# Patient Record
Sex: FEMALE | Race: WHITE | NOT HISPANIC OR LATINO | Employment: FULL TIME | URBAN - METROPOLITAN AREA
[De-identification: names, ages, dates, MRNs, and addresses within clinical notes are randomized per-mention and may not be internally consistent; named-entity substitution may affect disease eponyms.]

---

## 2022-07-15 ENCOUNTER — LAB VISIT (OUTPATIENT)
Dept: LAB | Facility: HOSPITAL | Age: 32
End: 2022-07-15
Attending: FAMILY MEDICINE
Payer: MEDICAID

## 2022-07-15 ENCOUNTER — OFFICE VISIT (OUTPATIENT)
Dept: PRIMARY CARE CLINIC | Facility: CLINIC | Age: 32
End: 2022-07-15
Payer: MEDICAID

## 2022-07-15 VITALS
WEIGHT: 190.69 LBS | DIASTOLIC BLOOD PRESSURE: 80 MMHG | HEIGHT: 66 IN | SYSTOLIC BLOOD PRESSURE: 100 MMHG | HEART RATE: 85 BPM | BODY MASS INDEX: 30.65 KG/M2

## 2022-07-15 DIAGNOSIS — R63.5 ABNORMAL WEIGHT GAIN: ICD-10-CM

## 2022-07-15 DIAGNOSIS — Z13.220 SCREENING, LIPID: ICD-10-CM

## 2022-07-15 DIAGNOSIS — R06.83 SNORING: ICD-10-CM

## 2022-07-15 DIAGNOSIS — R53.83 FATIGUE, UNSPECIFIED TYPE: Primary | ICD-10-CM

## 2022-07-15 DIAGNOSIS — F41.9 ANXIETY: ICD-10-CM

## 2022-07-15 DIAGNOSIS — R53.83 FATIGUE, UNSPECIFIED TYPE: ICD-10-CM

## 2022-07-15 LAB
ALBUMIN SERPL BCP-MCNC: 4 G/DL (ref 3.5–5.2)
ALP SERPL-CCNC: 60 U/L (ref 55–135)
ALT SERPL W/O P-5'-P-CCNC: 14 U/L (ref 10–44)
ANION GAP SERPL CALC-SCNC: 9 MMOL/L (ref 8–16)
AST SERPL-CCNC: 14 U/L (ref 10–40)
BILIRUB SERPL-MCNC: 0.6 MG/DL (ref 0.1–1)
BUN SERPL-MCNC: 9 MG/DL (ref 6–20)
CALCIUM SERPL-MCNC: 9.2 MG/DL (ref 8.7–10.5)
CHLORIDE SERPL-SCNC: 105 MMOL/L (ref 95–110)
CHOLEST SERPL-MCNC: 158 MG/DL (ref 120–199)
CHOLEST/HDLC SERPL: 3.9 {RATIO} (ref 2–5)
CO2 SERPL-SCNC: 26 MMOL/L (ref 23–29)
CREAT SERPL-MCNC: 0.7 MG/DL (ref 0.5–1.4)
EST. GFR  (AFRICAN AMERICAN): >60 ML/MIN/1.73 M^2
EST. GFR  (NON AFRICAN AMERICAN): >60 ML/MIN/1.73 M^2
ESTIMATED AVG GLUCOSE: 100 MG/DL (ref 68–131)
GLUCOSE SERPL-MCNC: 84 MG/DL (ref 70–110)
HBA1C MFR BLD: 5.1 % (ref 4–5.6)
HDLC SERPL-MCNC: 41 MG/DL (ref 40–75)
HDLC SERPL: 25.9 % (ref 20–50)
INSULIN COLLECTION INTERVAL: NORMAL
INSULIN SERPL-ACNC: 12.8 UU/ML
LDLC SERPL CALC-MCNC: 104.8 MG/DL (ref 63–159)
NONHDLC SERPL-MCNC: 117 MG/DL
POTASSIUM SERPL-SCNC: 4.3 MMOL/L (ref 3.5–5.1)
PROT SERPL-MCNC: 7.2 G/DL (ref 6–8.4)
SODIUM SERPL-SCNC: 140 MMOL/L (ref 136–145)
TRIGL SERPL-MCNC: 61 MG/DL (ref 30–150)

## 2022-07-15 PROCEDURE — 80061 LIPID PANEL: CPT | Performed by: FAMILY MEDICINE

## 2022-07-15 PROCEDURE — 83036 HEMOGLOBIN GLYCOSYLATED A1C: CPT | Performed by: FAMILY MEDICINE

## 2022-07-15 PROCEDURE — 3074F PR MOST RECENT SYSTOLIC BLOOD PRESSURE < 130 MM HG: ICD-10-PCS | Mod: CPTII,,, | Performed by: FAMILY MEDICINE

## 2022-07-15 PROCEDURE — 80053 COMPREHEN METABOLIC PANEL: CPT | Performed by: FAMILY MEDICINE

## 2022-07-15 PROCEDURE — 99999 PR PBB SHADOW E&M-EST. PATIENT-LVL III: CPT | Mod: PBBFAC,,, | Performed by: FAMILY MEDICINE

## 2022-07-15 PROCEDURE — 1160F PR REVIEW ALL MEDS BY PRESCRIBER/CLIN PHARMACIST DOCUMENTED: ICD-10-PCS | Mod: CPTII,,, | Performed by: FAMILY MEDICINE

## 2022-07-15 PROCEDURE — 3079F PR MOST RECENT DIASTOLIC BLOOD PRESSURE 80-89 MM HG: ICD-10-PCS | Mod: CPTII,,, | Performed by: FAMILY MEDICINE

## 2022-07-15 PROCEDURE — 1159F PR MEDICATION LIST DOCUMENTED IN MEDICAL RECORD: ICD-10-PCS | Mod: CPTII,,, | Performed by: FAMILY MEDICINE

## 2022-07-15 PROCEDURE — 3008F PR BODY MASS INDEX (BMI) DOCUMENTED: ICD-10-PCS | Mod: CPTII,,, | Performed by: FAMILY MEDICINE

## 2022-07-15 PROCEDURE — 83525 ASSAY OF INSULIN: CPT | Performed by: FAMILY MEDICINE

## 2022-07-15 PROCEDURE — 3079F DIAST BP 80-89 MM HG: CPT | Mod: CPTII,,, | Performed by: FAMILY MEDICINE

## 2022-07-15 PROCEDURE — 99999 PR PBB SHADOW E&M-EST. PATIENT-LVL III: ICD-10-PCS | Mod: PBBFAC,,, | Performed by: FAMILY MEDICINE

## 2022-07-15 PROCEDURE — 99204 PR OFFICE/OUTPT VISIT, NEW, LEVL IV, 45-59 MIN: ICD-10-PCS | Mod: S$PBB,,, | Performed by: FAMILY MEDICINE

## 2022-07-15 PROCEDURE — 1159F MED LIST DOCD IN RCRD: CPT | Mod: CPTII,,, | Performed by: FAMILY MEDICINE

## 2022-07-15 PROCEDURE — 1160F RVW MEDS BY RX/DR IN RCRD: CPT | Mod: CPTII,,, | Performed by: FAMILY MEDICINE

## 2022-07-15 PROCEDURE — 99213 OFFICE O/P EST LOW 20 MIN: CPT | Mod: PBBFAC | Performed by: FAMILY MEDICINE

## 2022-07-15 PROCEDURE — 3074F SYST BP LT 130 MM HG: CPT | Mod: CPTII,,, | Performed by: FAMILY MEDICINE

## 2022-07-15 PROCEDURE — 99204 OFFICE O/P NEW MOD 45 MIN: CPT | Mod: S$PBB,,, | Performed by: FAMILY MEDICINE

## 2022-07-15 PROCEDURE — 3008F BODY MASS INDEX DOCD: CPT | Mod: CPTII,,, | Performed by: FAMILY MEDICINE

## 2022-07-15 RX ORDER — METFORMIN HYDROCHLORIDE 500 MG/1
500 TABLET, EXTENDED RELEASE ORAL 2 TIMES DAILY WITH MEALS
Qty: 60 TABLET | Refills: 2 | Status: SHIPPED | OUTPATIENT
Start: 2022-07-15

## 2022-07-15 NOTE — PROGRESS NOTES
Subjective:       Patient ID: Lily Linda is a 32 y.o. female.  Pmhx, fam hx, soc hx, surg hx, allergies, med list reviewed    Chief Complaint: No chief complaint on file.  Referring MD: Myke  PCP: none (pt lives in [place]--given some resources)  BMI noted  Diet: variable  Exercise/Activity: not active; used to run  Sleep: fair; she does snore; pt agreeable to consider sleep study  Stressors: none new  Works at [employer]  Anxiety/Depression Screen/PHQ-2: neg; 0    Pt here from Dr Stringer for lifestyle changes. Has been gaining weight and significant fatigue. She would like to be 145-150.  Was eating healthy a few years ago; got put on phentermine. Tolerated but some elevated heart rate.       Some anxiety/depression. No mh crisis. No recent worsening.     Pt had some labs for spotting: tsh was within normal limits. Dr Stringer mentioned doing IR testing.   .No fam hx of DM II. No hx of gestational DM II.     Diet has improved since started working.   Buys tea by the gallon--has not tried unsweet. Drinks sometimes citrus. NO coffee.     Pt usually eats a rice cake with peanut butter. Likes this. Drinking water.     For lunch brings [employer] and [employer] lunches. Uses white bread. Does not like wheat bread. Likes butter bread.   Dinner: sometimes puts stuff in air fryer.   Likes rice. Has not tried brown rice. Does not like cauli rice.   Lives salad and cucumbers. Does not like hummus. Likes yogurt.   Does not snack.   Likes fruit-pineapple and cantaloupe.     HPI  Review of Systems   Constitutional: Negative for activity change, appetite change, fatigue and fever.   HENT: Negative for mouth dryness and goiter.    Eyes: Negative for visual disturbance.   Respiratory: Negative for apnea, cough, chest tightness and shortness of breath.    Cardiovascular: Negative for chest pain, palpitations and leg swelling.   Gastrointestinal: Negative for abdominal pain, constipation and diarrhea.   Endocrine: Negative  for cold intolerance, heat intolerance, polydipsia, polyphagia and polyuria.   Genitourinary: Negative for frequency and menstrual problem.   Musculoskeletal: Negative for arthralgias and myalgias.   Integumentary:  Negative for color change and rash.   Psychiatric/Behavioral: Negative for sleep disturbance. The patient is not nervous/anxious.          Objective:      Physical Exam  Vitals and nursing note reviewed.   Constitutional:       General: She is not in acute distress.  HENT:      Head: Normocephalic and atraumatic.      Mouth/Throat:      Pharynx: Oropharynx is clear.   Eyes:      General: No scleral icterus.     Pupils: Pupils are equal, round, and reactive to light.   Neck:      Comments: No TM  Cardiovascular:      Rate and Rhythm: Normal rate and regular rhythm.      Pulses: Normal pulses.      Heart sounds: Normal heart sounds. No murmur heard.    No friction rub. No gallop.   Pulmonary:      Effort: Pulmonary effort is normal. No respiratory distress.      Breath sounds: Normal breath sounds. No wheezing, rhonchi or rales.   Abdominal:      General: Bowel sounds are normal. There is no distension.      Palpations: Abdomen is soft.      Tenderness: There is no abdominal tenderness.   Musculoskeletal:         General: No swelling.      Cervical back: Normal range of motion and neck supple. No tenderness.   Lymphadenopathy:      Cervical: No cervical adenopathy.   Skin:     General: Skin is warm.      Capillary Refill: Capillary refill takes less than 2 seconds.      Findings: No erythema or rash.   Neurological:      Mental Status: She is alert and oriented to person, place, and time.   Psychiatric:         Mood and Affect: Mood normal.         Behavior: Behavior normal.         Assessment:       Problem List Items Addressed This Visit    None     Visit Diagnoses     Abnormal weight gain                ICD-10-CM ICD-9-CM   1. Fatigue, unspecified type  R53.83 780.79   2. Abnormal weight gain  R63.5  783.1   3. Anxiety  F41.9 300.00   4. Body mass index (BMI) 30.0-30.9, adult  Z68.30 V85.30   5. Screening, lipid  Z13.220 V77.91   6. Snoring  R06.83 786.09     Plan:       Fatigue, unspecified type  Comments:  check labs, est PCP (pt given numbers); referral sleep eval  Orders:  -     Hemoglobin A1C; Future; Expected date: 07/15/2022  -     Insulin, Random; Future; Expected date: 07/15/2022  -     Comprehensive Metabolic Panel; Future; Expected date: 07/15/2022  -     metFORMIN (GLUCOPHAGE-XR) 500 MG ER 24hr tablet; Take 1 tablet (500 mg total) by mouth 2 (two) times daily with meals.  Dispense: 60 tablet; Refill: 2  -     Ambulatory referral/consult to Pulmonology; Future; Expected date: 07/22/2022    Abnormal weight gain  Comments:  Pt to call and schedule gtt/insulin test with Dr Stringer's office  rx changed to metformin xr (holding until test) d/w pt SE's; start daily not bid  Orders:  -     Ambulatory referral/consult to Family Practice  -     Hemoglobin A1C; Future; Expected date: 07/15/2022  -     Insulin, Random; Future; Expected date: 07/15/2022  -     Lipid Panel; Future; Expected date: 07/15/2022  -     Comprehensive Metabolic Panel; Future; Expected date: 07/15/2022  -     metFORMIN (GLUCOPHAGE-XR) 500 MG ER 24hr tablet; Take 1 tablet (500 mg total) by mouth 2 (two) times daily with meals.  Dispense: 60 tablet; Refill: 2    Anxiety  Comments:  chronic/intermittent/stable  Orders:  -     Comprehensive Metabolic Panel; Future; Expected date: 07/15/2022    Body mass index (BMI) 30.0-30.9, adult  -     Hemoglobin A1C; Future; Expected date: 07/15/2022  -     Insulin, Random; Future; Expected date: 07/15/2022  -     Lipid Panel; Future; Expected date: 07/15/2022  -     Ambulatory referral/consult to Pulmonology; Future; Expected date: 07/22/2022    Screening, lipid  -     Lipid Panel; Future; Expected date: 07/15/2022    Snoring  -     Ambulatory referral/consult to Pulmonology; Future; Expected date:  07/22/2022       Check labs today  Food log  [facility] eat fit log  Labs today  No fam hx of men 2, pancreatitis; would be willing to try glp-1 if can get approved; d/w her moa, potential side effects    45 min spent with patient; will have close fu/ 2-3 weeks

## 2022-07-18 ENCOUNTER — PATIENT MESSAGE (OUTPATIENT)
Dept: PRIMARY CARE CLINIC | Facility: CLINIC | Age: 32
End: 2022-07-18
Payer: MEDICAID

## 2022-07-19 ENCOUNTER — PATIENT MESSAGE (OUTPATIENT)
Dept: PRIMARY CARE CLINIC | Facility: CLINIC | Age: 32
End: 2022-07-19
Payer: MEDICAID

## 2022-07-19 ENCOUNTER — DOCUMENTATION ONLY (OUTPATIENT)
Dept: PRIMARY CARE CLINIC | Facility: CLINIC | Age: 32
End: 2022-07-19
Payer: MEDICAID

## 2022-07-19 ENCOUNTER — PATIENT MESSAGE (OUTPATIENT)
Dept: PULMONOLOGY | Facility: CLINIC | Age: 32
End: 2022-07-19
Payer: MEDICAID

## 2022-08-03 ENCOUNTER — TELEPHONE (OUTPATIENT)
Dept: GASTROENTEROLOGY | Facility: CLINIC | Age: 32
End: 2022-08-03
Payer: MEDICAID

## 2022-08-03 NOTE — TELEPHONE ENCOUNTER
Returned call to pt who requested a later time on the day of her appt on 8/9/22. Pt was informed that the schedule was fully booked and was offered a different day. Pt stated she will keep the appointment and try to make it on time.

## 2022-08-03 NOTE — TELEPHONE ENCOUNTER
----- Message from Andi Linares sent at 8/3/2022  4:43 PM CDT -----  Contact: [phone]  Type:  Sooner Apoointment Request    Caller is requesting a sooner appointment.  Caller declined first available appointment listed below.  Caller will not accept being placed on the waitlist and is requesting a message be sent to doctor.  Name of Caller:PATSY BRIONES   When is the first available appointment?  Symptoms:appt  Would the patient rather a call back or a response via [facility]? Call back   Best Call Back Number:[phone]  Additional Information: sooner appointment request due to childcare obligations      Thanks  Nathan

## 2022-08-09 ENCOUNTER — OFFICE VISIT (OUTPATIENT)
Dept: GASTROENTEROLOGY | Facility: CLINIC | Age: 32
End: 2022-08-09
Payer: MEDICAID

## 2022-08-09 ENCOUNTER — OFFICE VISIT (OUTPATIENT)
Dept: PRIMARY CARE CLINIC | Facility: CLINIC | Age: 32
End: 2022-08-09
Payer: MEDICAID

## 2022-08-09 ENCOUNTER — HOSPITAL ENCOUNTER (OUTPATIENT)
Dept: RADIOLOGY | Facility: HOSPITAL | Age: 32
Discharge: HOME | End: 2022-08-09
Attending: PHYSICIAN ASSISTANT
Payer: MEDICAID

## 2022-08-09 VITALS
OXYGEN SATURATION: 99 % | HEART RATE: 60 BPM | HEIGHT: 66 IN | WEIGHT: 188.5 LBS | SYSTOLIC BLOOD PRESSURE: 120 MMHG | BODY MASS INDEX: 30.29 KG/M2 | DIASTOLIC BLOOD PRESSURE: 84 MMHG

## 2022-08-09 DIAGNOSIS — R63.5 ABNORMAL WEIGHT GAIN: ICD-10-CM

## 2022-08-09 DIAGNOSIS — R19.7 DIARRHEA, UNSPECIFIED TYPE: ICD-10-CM

## 2022-08-09 DIAGNOSIS — K59.00 CONSTIPATION, UNSPECIFIED CONSTIPATION TYPE: ICD-10-CM

## 2022-08-09 DIAGNOSIS — R14.0 BLOATING: Primary | ICD-10-CM

## 2022-08-09 DIAGNOSIS — R53.83 FATIGUE, UNSPECIFIED TYPE: Primary | ICD-10-CM

## 2022-08-09 DIAGNOSIS — R06.83 SNORING: ICD-10-CM

## 2022-08-09 DIAGNOSIS — R63.5 WEIGHT GAIN: ICD-10-CM

## 2022-08-09 DIAGNOSIS — E66.9 OBESITY (BMI 30.0-34.9): ICD-10-CM

## 2022-08-09 DIAGNOSIS — R11.0 NAUSEA: ICD-10-CM

## 2022-08-09 DIAGNOSIS — K21.9 GASTROESOPHAGEAL REFLUX DISEASE, UNSPECIFIED WHETHER ESOPHAGITIS PRESENT: ICD-10-CM

## 2022-08-09 DIAGNOSIS — R14.0 BLOATING: ICD-10-CM

## 2022-08-09 PROCEDURE — 99203 PR OFFICE/OUTPT VISIT, NEW, LEVL III, 30-44 MIN: ICD-10-PCS | Mod: S$PBB,,, | Performed by: PHYSICIAN ASSISTANT

## 2022-08-09 PROCEDURE — 3074F PR MOST RECENT SYSTOLIC BLOOD PRESSURE < 130 MM HG: ICD-10-PCS | Mod: CPTII,,, | Performed by: PHYSICIAN ASSISTANT

## 2022-08-09 PROCEDURE — 74019 RADEX ABDOMEN 2 VIEWS: CPT | Mod: TC

## 2022-08-09 PROCEDURE — 1160F RVW MEDS BY RX/DR IN RCRD: CPT | Mod: CPTII,,, | Performed by: FAMILY MEDICINE

## 2022-08-09 PROCEDURE — 99212 OFFICE O/P EST SF 10 MIN: CPT | Mod: PBBFAC,27 | Performed by: FAMILY MEDICINE

## 2022-08-09 PROCEDURE — 99214 OFFICE O/P EST MOD 30 MIN: CPT | Mod: PBBFAC | Performed by: PHYSICIAN ASSISTANT

## 2022-08-09 PROCEDURE — 1159F PR MEDICATION LIST DOCUMENTED IN MEDICAL RECORD: ICD-10-PCS | Mod: CPTII,,, | Performed by: PHYSICIAN ASSISTANT

## 2022-08-09 PROCEDURE — 3074F SYST BP LT 130 MM HG: CPT | Mod: CPTII,,, | Performed by: PHYSICIAN ASSISTANT

## 2022-08-09 PROCEDURE — 3079F PR MOST RECENT DIASTOLIC BLOOD PRESSURE 80-89 MM HG: ICD-10-PCS | Mod: CPTII,,, | Performed by: PHYSICIAN ASSISTANT

## 2022-08-09 PROCEDURE — 1159F MED LIST DOCD IN RCRD: CPT | Mod: CPTII,,, | Performed by: FAMILY MEDICINE

## 2022-08-09 PROCEDURE — 74019 XR ABDOMEN FLAT AND ERECT: ICD-10-PCS | Mod: 26,,, | Performed by: RADIOLOGY

## 2022-08-09 PROCEDURE — 3044F PR MOST RECENT HEMOGLOBIN A1C LEVEL <7.0%: ICD-10-PCS | Mod: CPTII,,, | Performed by: FAMILY MEDICINE

## 2022-08-09 PROCEDURE — 3079F DIAST BP 80-89 MM HG: CPT | Mod: CPTII,,, | Performed by: PHYSICIAN ASSISTANT

## 2022-08-09 PROCEDURE — 1159F MED LIST DOCD IN RCRD: CPT | Mod: CPTII,,, | Performed by: PHYSICIAN ASSISTANT

## 2022-08-09 PROCEDURE — 1159F PR MEDICATION LIST DOCUMENTED IN MEDICAL RECORD: ICD-10-PCS | Mod: CPTII,,, | Performed by: FAMILY MEDICINE

## 2022-08-09 PROCEDURE — 99999 PR PBB SHADOW E&M-EST. PATIENT-LVL II: ICD-10-PCS | Mod: PBBFAC,,, | Performed by: FAMILY MEDICINE

## 2022-08-09 PROCEDURE — 3008F BODY MASS INDEX DOCD: CPT | Mod: CPTII,,, | Performed by: PHYSICIAN ASSISTANT

## 2022-08-09 PROCEDURE — 99999 PR PBB SHADOW E&M-EST. PATIENT-LVL IV: ICD-10-PCS | Mod: PBBFAC,,, | Performed by: PHYSICIAN ASSISTANT

## 2022-08-09 PROCEDURE — 3044F HG A1C LEVEL LT 7.0%: CPT | Mod: CPTII,,, | Performed by: FAMILY MEDICINE

## 2022-08-09 PROCEDURE — 99214 PR OFFICE/OUTPT VISIT, EST, LEVL IV, 30-39 MIN: ICD-10-PCS | Mod: S$PBB,,, | Performed by: FAMILY MEDICINE

## 2022-08-09 PROCEDURE — 3044F PR MOST RECENT HEMOGLOBIN A1C LEVEL <7.0%: ICD-10-PCS | Mod: CPTII,,, | Performed by: PHYSICIAN ASSISTANT

## 2022-08-09 PROCEDURE — 99214 OFFICE O/P EST MOD 30 MIN: CPT | Mod: S$PBB,,, | Performed by: FAMILY MEDICINE

## 2022-08-09 PROCEDURE — 3044F HG A1C LEVEL LT 7.0%: CPT | Mod: CPTII,,, | Performed by: PHYSICIAN ASSISTANT

## 2022-08-09 PROCEDURE — 99203 OFFICE O/P NEW LOW 30 MIN: CPT | Mod: S$PBB,,, | Performed by: PHYSICIAN ASSISTANT

## 2022-08-09 PROCEDURE — 3008F PR BODY MASS INDEX (BMI) DOCUMENTED: ICD-10-PCS | Mod: CPTII,,, | Performed by: PHYSICIAN ASSISTANT

## 2022-08-09 PROCEDURE — 1160F PR REVIEW ALL MEDS BY PRESCRIBER/CLIN PHARMACIST DOCUMENTED: ICD-10-PCS | Mod: CPTII,,, | Performed by: FAMILY MEDICINE

## 2022-08-09 PROCEDURE — 74019 RADEX ABDOMEN 2 VIEWS: CPT | Mod: 26,,, | Performed by: RADIOLOGY

## 2022-08-09 PROCEDURE — 99999 PR PBB SHADOW E&M-EST. PATIENT-LVL II: CPT | Mod: PBBFAC,,, | Performed by: FAMILY MEDICINE

## 2022-08-09 PROCEDURE — 99999 PR PBB SHADOW E&M-EST. PATIENT-LVL IV: CPT | Mod: PBBFAC,,, | Performed by: PHYSICIAN ASSISTANT

## 2022-08-09 RX ORDER — PHENTERMINE HYDROCHLORIDE 37.5 MG/1
TABLET ORAL
Qty: 30 TABLET | Refills: 0 | Status: SHIPPED | OUTPATIENT
Start: 2022-08-09

## 2022-08-09 NOTE — PROGRESS NOTES
Clinic Consult:  [facility] Consultation Note    Reason for Consult:  The primary encounter diagnosis was Bloating. Diagnoses of Abnormal weight gain, Constipation, unspecified constipation type, Diarrhea, unspecified type, Nausea, and Gastroesophageal reflux disease, unspecified whether esophagitis present were also pertinent to this visit.    PCP: Primary Doctor Jesica   [address] / [place] [address]    CC: Abdominal Pain and Diarrhea      HPI:  This is a 32 y.o. female here for evaluation of the above. Began with severe fatigue, nausea, stomach problems. Alternating between diarrhea and constipation. Always had problems with stomach - throughout the day her stomach will hurt, with or without eating. For the past year she has had change in bowel habits. No blood in the stool. Having a BM twice daily but can be more. After a hard BM, it will follow with diarrhea and nausea. No vomiting. Always have indigestion and gas and bloating. Has been to the ED for this. Never had scopes. No GI family history. Just prescribed Metformin but has not taken yet.     Review of Systems   Constitutional: Positive for fatigue and unexpected weight change (gain). Negative for activity change, appetite change, chills, diaphoresis and fever.   HENT: Negative for trouble swallowing.    Respiratory: Negative for cough and shortness of breath.    Cardiovascular: Negative for chest pain.   Gastrointestinal: Positive for abdominal distention (bloating), abdominal pain, constipation, diarrhea and nausea. Negative for anal bleeding, blood in stool and vomiting.   Genitourinary: Negative for difficulty urinating, dysuria and hematuria.   Musculoskeletal: Negative for back pain.   Skin: Negative for color change and pallor.   Neurological: Negative for dizziness, weakness and light-headedness.   Psychiatric/Behavioral: Negative for dysphoric mood. The patient is not nervous/anxious.         Medical History:  "  Past Medical History:   Diagnosis Date    Abnormal glandular Papanicolaou smear of cervix 3/10/2014 10:04:41 AM    [facility] Historical - Gynecologic: Abnormal PAP Smear-lgsil    Unspecified chlamydial infection, in conditions classified elsewhere and of unspecified site 5/17/2016 4:57:49 PM    [facility] Historical - [facility]: Chlamydia-No Additional Notes       Surgical History:   No past surgical history on file.    Family History:    No family history on file.    Social History:   Social History     Socioeconomic History    Marital status: Single   Tobacco Use    Smoking status: Never Smoker    Smokeless tobacco: Never Used   Substance and Sexual Activity    Drug use: Never    Sexual activity: Yes     Partners: Male     Birth control/protection: Partner-Vasectomy       Allergies:   Review of patient's allergies indicates:  No Known Allergies    Home Medications:   Current Outpatient Medications on File Prior to Visit   Medication Sig Dispense Refill    metFORMIN (GLUCOPHAGE-XR) 500 MG ER 24hr tablet Take 1 tablet (500 mg total) by mouth 2 (two) times daily with meals. (Patient not taking: Reported on 8/9/2022) 60 tablet 2     No current facility-administered medications on file prior to visit.       Physical Exam:  /84   Pulse 60   Ht 5' 6" (1.676 m)   Wt 85.5 kg (188 lb 7.9 oz)   SpO2 99%   BMI 30.42 kg/m²   Body mass index is 30.42 kg/m².  Physical Exam  Constitutional:       General: She is not in acute distress.     Appearance: Normal appearance. She is not ill-appearing, toxic-appearing or diaphoretic.   HENT:      Head: Normocephalic and atraumatic.   Eyes:      General: No scleral icterus.     Extraocular Movements: Extraocular movements intact.   Cardiovascular:      Rate and Rhythm: Normal rate and regular rhythm.   Pulmonary:      Effort: Pulmonary effort is normal. No respiratory distress.      Breath sounds: Normal breath sounds.   Abdominal:      General: Bowel sounds are " normal. There is no distension.      Palpations: Abdomen is soft. There is no mass.      Tenderness: There is no abdominal tenderness. There is no guarding.   Musculoskeletal:         General: Normal range of motion.      Cervical back: Normal range of motion.   Skin:     General: Skin is warm and dry.      Coloration: Skin is not jaundiced or pale.   Neurological:      Mental Status: She is alert and oriented to person, place, and time.   Psychiatric:         Mood and Affect: Mood normal.         Behavior: Behavior normal.           Labs: Pertinent labs reviewed.  Endoscopy: none  CRC Screening: none  Anticoagulation: none    Assessment:  1. Bloating    2. Abnormal weight gain    3. Constipation, unspecified constipation type    4. Diarrhea, unspecified type    5. Nausea    6. Gastroesophageal reflux disease, unspecified whether esophagitis present         Recommendations:  -Suspect constipation  -Xray today for further evaluation. Can be contributing to discomfort, diarrhea, nausea, weight gain, gas and reflux  -Discussed starting Protonix. She does not like taking medications unless absolutely necessary. Will see what Xray results are. If constipated will treat and see if reflux improves. If not, will treat with PPI.  -All labs normal.  -GERD diet.    Bloating  -     X-Ray Abdomen Flat And Erect; Future; Expected date: 08/09/2022    Abnormal weight gain  -     Ambulatory referral/consult to Gastroenterology  -     X-Ray Abdomen Flat And Erect; Future; Expected date: 08/09/2022    Constipation, unspecified constipation type  -     X-Ray Abdomen Flat And Erect; Future; Expected date: 08/09/2022    Diarrhea, unspecified type  -     X-Ray Abdomen Flat And Erect; Future; Expected date: 08/09/2022    Nausea  -     X-Ray Abdomen Flat And Erect; Future; Expected date: 08/09/2022    Gastroesophageal reflux disease, unspecified whether esophagitis present        No follow-ups on file.    Thank you so much for allowing me  to participate in the care of Lily Austin PA-C

## 2022-08-09 NOTE — PROGRESS NOTES
Subjective:       Patient ID: Lily Linda is a 32 y.o. female.  Pmhx, fam hx, soc hx, surg hx, allergies, med list reviewed    Chief Complaint: recheck (Refer by physician for wellness and weight gain)    Wt Readings from Last 3 Encounters:   08/09/22 0846 85.5 kg (188 lb 7.9 oz)   07/15/22 0806 86.5 kg (190 lb 11.2 oz)   07/05/22 1450 87.1 kg (192 lb)       Pt has lost 4 lb since last month. Sometimes she gets so busy forgets to drink. At home is doing well. Sister on board. Carlitos does tempt at times with candy but she has tried other things.Tea  Is still an issue but has added water in. No new issues at work. Pt is trying to find balance in eating out vs bringing lunch to work. Has tried some almonds for snacks. Holding off on metformin.   Insurance not covering glp-1.    Had GI appt but still no contact with Pulmonary; was left vm and central scheduling but has not heard back. Pt needs number to call.     Energy has been modestly improved. On occasion feels tired. Son started back to school.     Pt feels like has done well overall with watching sugars. Is agreeable to send another food log.     Pt took phentermine in the past (on for 2 mos 2-3 years ago) and did not have any significant side effects. No hx of bipolar. No uncontrolled anxiety. No hallucinations.   NO hx of palpitations or ectopy.          HPI  Review of Systems   Constitutional: Negative for activity change, appetite change, fatigue and fever.   HENT: Negative for mouth dryness and goiter.    Eyes: Negative for visual disturbance.   Respiratory: Negative for apnea, cough, chest tightness and shortness of breath.    Cardiovascular: Negative for chest pain, palpitations and leg swelling.   Gastrointestinal: Negative for abdominal pain, constipation and diarrhea.   Endocrine: Negative for cold intolerance, heat intolerance, polydipsia, polyphagia and polyuria.   Genitourinary: Negative for frequency and menstrual problem.   Musculoskeletal:  Negative for arthralgias and myalgias.   Integumentary:  Negative for color change and rash.   Psychiatric/Behavioral: Negative for sleep disturbance. The patient is not nervous/anxious.          Objective:      Physical Exam  Vitals and nursing note reviewed.   Constitutional:       General: She is not in acute distress.  HENT:      Head: Normocephalic and atraumatic.      Mouth/Throat:      Pharynx: Oropharynx is clear.   Eyes:      General: No scleral icterus.     Pupils: Pupils are equal, round, and reactive to light.   Neck:      Comments: No TM  Cardiovascular:      Rate and Rhythm: Normal rate and regular rhythm.      Pulses: Normal pulses.      Heart sounds: Normal heart sounds. No murmur heard.    No friction rub. No gallop.   Pulmonary:      Effort: Pulmonary effort is normal. No respiratory distress.      Breath sounds: Normal breath sounds. No wheezing, rhonchi or rales.   Abdominal:      General: Bowel sounds are normal. There is no distension.      Palpations: Abdomen is soft.      Tenderness: There is no abdominal tenderness.   Musculoskeletal:         General: No swelling.      Cervical back: Normal range of motion and neck supple. No tenderness.   Lymphadenopathy:      Cervical: No cervical adenopathy.   Skin:     General: Skin is warm.      Findings: No erythema or rash.   Neurological:      Mental Status: She is alert and oriented to person, place, and time.   Psychiatric:         Mood and Affect: Mood normal.         Behavior: Behavior normal.         Assessment:       Problem List Items Addressed This Visit    None         1. Fatigue, unspecified type    2. Snoring    3. Weight gain    4. Obesity (BMI 30.0-34.9)    5.      Constipation    Plan:       Fatigue, unspecified type  Snoring  Pt given number again to call and schedule for sleep clinic eval    Weight gain  Obesity (BMI 30.0-34.9)  -     phentermine (ADIPEX-P) 37.5 mg tablet; 1/2-1 pill po daily  Dispense: 30 tablet; Refill: 0    Chronic  Constipation w/up underway: pt advised to keep GI appt; had xray today; looks okay overall; w/up per them  Did d/w pt ways to increase fiber in small increments (pt to try this week) from dietary sources as well as possible supplement (benefiber or similar)  Also, increase water consumption at work       DW pt risks/benefits/side effects at length including risks/benefit/se's elevated hr, elevated bp, dry mouth, irritability. Pt is simultaneously working on lifestyle changes.   Very low risk abuse potential; on no other stimulants; pt reminded med information sent via [employer] (on AVS)--she prefers to read digitally    reviewed          Will give pt number for pulmonary  Digestive probiotic   Ok for b complex supplement  Work on water at work    30 + min spent with pt; gi note/xray reviewed  Reviewed with pt risks/benefits/potential se's of therapy as well as printed coupon   F/u 4-6 weeks

## 2022-08-10 DIAGNOSIS — R11.0 NAUSEA: Primary | ICD-10-CM

## 2022-08-10 DIAGNOSIS — K21.9 GASTROESOPHAGEAL REFLUX DISEASE, UNSPECIFIED WHETHER ESOPHAGITIS PRESENT: ICD-10-CM

## 2022-08-10 RX ORDER — PANTOPRAZOLE SODIUM 20 MG/1
20 TABLET, DELAYED RELEASE ORAL DAILY
Qty: 30 TABLET | Refills: 11 | Status: SHIPPED | OUTPATIENT
Start: 2022-08-10 | End: 2023-08-10

## 2022-08-12 ENCOUNTER — PATIENT MESSAGE (OUTPATIENT)
Dept: GASTROENTEROLOGY | Facility: CLINIC | Age: 32
End: 2022-08-12
Payer: MEDICAID

## 2024-10-17 ENCOUNTER — PATIENT MESSAGE (OUTPATIENT)
Dept: ADMINISTRATIVE | Facility: OTHER | Age: 34
End: 2024-10-17
Payer: MEDICAID

## 2024-11-22 ENCOUNTER — HOSPITAL ENCOUNTER (OUTPATIENT)
Facility: HOSPITAL | Age: 34
LOS: 1 days | Discharge: HOME | End: 2024-11-25
Attending: EMERGENCY MEDICINE | Admitting: OBSTETRICS & GYNECOLOGY
Payer: MEDICAID

## 2024-11-22 DIAGNOSIS — K80.20 CHOLELITHIASIS AFFECTING PREGNANCY IN SECOND TRIMESTER, ANTEPARTUM: Primary | ICD-10-CM

## 2024-11-22 DIAGNOSIS — K80.10 CALCULUS OF GALLBLADDER WITH CHOLECYSTITIS WITHOUT BILIARY OBSTRUCTION, UNSPECIFIED CHOLECYSTITIS ACUITY: ICD-10-CM

## 2024-11-22 DIAGNOSIS — R00.0 TACHYCARDIA: ICD-10-CM

## 2024-11-22 DIAGNOSIS — K80.50 BILIARY COLIC: ICD-10-CM

## 2024-11-22 DIAGNOSIS — O26.612 CHOLELITHIASIS AFFECTING PREGNANCY IN SECOND TRIMESTER, ANTEPARTUM: Primary | ICD-10-CM

## 2024-11-22 LAB
ALBUMIN SERPL BCP-MCNC: 2.9 G/DL (ref 3.5–5.2)
ALP SERPL-CCNC: 75 U/L (ref 40–150)
ALT SERPL W/O P-5'-P-CCNC: 12 U/L (ref 10–44)
AMORPH CRY URNS QL MICRO: ABNORMAL
ANION GAP SERPL CALC-SCNC: 10 MMOL/L (ref 8–16)
AST SERPL-CCNC: 14 U/L (ref 10–40)
BACTERIA #/AREA URNS HPF: ABNORMAL /HPF
BASOPHILS # BLD AUTO: 0.02 K/UL (ref 0–0.2)
BASOPHILS NFR BLD: 0.2 % (ref 0–1.9)
BILIRUB SERPL-MCNC: 0.2 MG/DL (ref 0.1–1)
BILIRUB UR QL STRIP: NEGATIVE
BUN SERPL-MCNC: 4 MG/DL (ref 6–20)
CALCIUM SERPL-MCNC: 8.8 MG/DL (ref 8.7–10.5)
CHLORIDE SERPL-SCNC: 107 MMOL/L (ref 95–110)
CLARITY UR: ABNORMAL
CO2 SERPL-SCNC: 22 MMOL/L (ref 23–29)
COLOR UR: YELLOW
CREAT SERPL-MCNC: 0.7 MG/DL (ref 0.5–1.4)
DIFFERENTIAL METHOD BLD: ABNORMAL
EOSINOPHIL # BLD AUTO: 0.1 K/UL (ref 0–0.5)
EOSINOPHIL NFR BLD: 0.6 % (ref 0–8)
ERYTHROCYTE [DISTWIDTH] IN BLOOD BY AUTOMATED COUNT: 13.8 % (ref 11.5–14.5)
EST. GFR  (NO RACE VARIABLE): >60 ML/MIN/1.73 M^2
GLUCOSE SERPL-MCNC: 93 MG/DL (ref 70–110)
GLUCOSE UR QL STRIP: ABNORMAL
HCT VFR BLD AUTO: 35.2 % (ref 37–48.5)
HGB BLD-MCNC: 11.8 G/DL (ref 12–16)
HGB UR QL STRIP: NEGATIVE
HYALINE CASTS #/AREA URNS LPF: 5 /LPF
IMM GRANULOCYTES # BLD AUTO: 0.06 K/UL (ref 0–0.04)
IMM GRANULOCYTES NFR BLD AUTO: 0.5 % (ref 0–0.5)
INFLUENZA A, MOLECULAR: NEGATIVE
INFLUENZA B, MOLECULAR: NEGATIVE
KETONES UR QL STRIP: NEGATIVE
LEUKOCYTE ESTERASE UR QL STRIP: NEGATIVE
LIPASE SERPL-CCNC: 6 U/L (ref 4–60)
LYMPHOCYTES # BLD AUTO: 2 K/UL (ref 1–4.8)
LYMPHOCYTES NFR BLD: 15.7 % (ref 18–48)
MCH RBC QN AUTO: 28.9 PG (ref 27–31)
MCHC RBC AUTO-ENTMCNC: 33.5 G/DL (ref 32–36)
MCV RBC AUTO: 86 FL (ref 82–98)
MICROSCOPIC COMMENT: ABNORMAL
MONOCYTES # BLD AUTO: 0.9 K/UL (ref 0.3–1)
MONOCYTES NFR BLD: 7.5 % (ref 4–15)
NEUTROPHILS # BLD AUTO: 9.4 K/UL (ref 1.8–7.7)
NEUTROPHILS NFR BLD: 75.5 % (ref 38–73)
NITRITE UR QL STRIP: NEGATIVE
NRBC BLD-RTO: 0 /100 WBC
PH UR STRIP: 8 [PH] (ref 5–8)
PLATELET # BLD AUTO: 259 K/UL (ref 150–450)
PMV BLD AUTO: 11 FL (ref 9.2–12.9)
POTASSIUM SERPL-SCNC: 3.4 MMOL/L (ref 3.5–5.1)
PROT SERPL-MCNC: 6.5 G/DL (ref 6–8.4)
PROT UR QL STRIP: ABNORMAL
RBC # BLD AUTO: 4.08 M/UL (ref 4–5.4)
RBC #/AREA URNS HPF: 3 /HPF (ref 0–4)
SARS-COV-2 RDRP RESP QL NAA+PROBE: NEGATIVE
SODIUM SERPL-SCNC: 139 MMOL/L (ref 136–145)
SP GR UR STRIP: 1.02 (ref 1–1.03)
SPECIMEN SOURCE: NORMAL
SQUAMOUS #/AREA URNS HPF: 7 /HPF
TROPONIN I SERPL DL<=0.01 NG/ML-MCNC: <0.006 NG/ML (ref 0–0.03)
URN SPEC COLLECT METH UR: ABNORMAL
UROBILINOGEN UR STRIP-ACNC: NEGATIVE EU/DL
WBC # BLD AUTO: 12.43 K/UL (ref 3.9–12.7)
WBC #/AREA URNS HPF: 20 /HPF (ref 0–5)
WBC CLUMPS URNS QL MICRO: ABNORMAL
YEAST URNS QL MICRO: ABNORMAL

## 2024-11-22 PROCEDURE — 96361 HYDRATE IV INFUSION ADD-ON: CPT

## 2024-11-22 PROCEDURE — 93010 ELECTROCARDIOGRAM REPORT: CPT | Mod: ,,, | Performed by: STUDENT IN AN ORGANIZED HEALTH CARE EDUCATION/TRAINING PROGRAM

## 2024-11-22 PROCEDURE — 85025 COMPLETE CBC W/AUTO DIFF WBC: CPT | Performed by: EMERGENCY MEDICINE

## 2024-11-22 PROCEDURE — 25000003 PHARM REV CODE 250: Performed by: EMERGENCY MEDICINE

## 2024-11-22 PROCEDURE — 96375 TX/PRO/DX INJ NEW DRUG ADDON: CPT

## 2024-11-22 PROCEDURE — 80053 COMPREHEN METABOLIC PANEL: CPT | Performed by: EMERGENCY MEDICINE

## 2024-11-22 PROCEDURE — 87502 INFLUENZA DNA AMP PROBE: CPT | Performed by: EMERGENCY MEDICINE

## 2024-11-22 PROCEDURE — 93005 ELECTROCARDIOGRAM TRACING: CPT

## 2024-11-22 PROCEDURE — 81000 URINALYSIS NONAUTO W/SCOPE: CPT | Performed by: EMERGENCY MEDICINE

## 2024-11-22 PROCEDURE — 83690 ASSAY OF LIPASE: CPT | Performed by: EMERGENCY MEDICINE

## 2024-11-22 PROCEDURE — 87635 SARS-COV-2 COVID-19 AMP PRB: CPT | Performed by: EMERGENCY MEDICINE

## 2024-11-22 PROCEDURE — 99285 EMERGENCY DEPT VISIT HI MDM: CPT | Mod: 25

## 2024-11-22 PROCEDURE — 63600175 PHARM REV CODE 636 W HCPCS: Performed by: EMERGENCY MEDICINE

## 2024-11-22 PROCEDURE — 84484 ASSAY OF TROPONIN QUANT: CPT | Performed by: EMERGENCY MEDICINE

## 2024-11-22 PROCEDURE — 96374 THER/PROPH/DIAG INJ IV PUSH: CPT

## 2024-11-22 PROCEDURE — 87086 URINE CULTURE/COLONY COUNT: CPT | Performed by: EMERGENCY MEDICINE

## 2024-11-22 RX ORDER — CEFTRIAXONE 1 G/1
1 INJECTION, POWDER, FOR SOLUTION INTRAMUSCULAR; INTRAVENOUS
Status: COMPLETED | OUTPATIENT
Start: 2024-11-22 | End: 2024-11-22

## 2024-11-22 RX ORDER — ACETAMINOPHEN 500 MG
1000 TABLET ORAL
Status: COMPLETED | OUTPATIENT
Start: 2024-11-22 | End: 2024-11-22

## 2024-11-22 RX ORDER — DOCUSATE SODIUM 100 MG
600 CAPSULE ORAL
Status: DISCONTINUED | OUTPATIENT
Start: 2024-11-22 | End: 2024-11-23

## 2024-11-22 RX ORDER — ONDANSETRON HYDROCHLORIDE 2 MG/ML
4 INJECTION, SOLUTION INTRAVENOUS
Status: COMPLETED | OUTPATIENT
Start: 2024-11-22 | End: 2024-11-22

## 2024-11-22 RX ORDER — MORPHINE SULFATE 4 MG/ML
2 INJECTION, SOLUTION INTRAMUSCULAR; INTRAVENOUS
Status: COMPLETED | OUTPATIENT
Start: 2024-11-22 | End: 2024-11-22

## 2024-11-22 RX ADMIN — ONDANSETRON 4 MG: 2 INJECTION INTRAMUSCULAR; INTRAVENOUS at 10:11

## 2024-11-22 RX ADMIN — CEFTRIAXONE 1 G: 1 INJECTION, POWDER, FOR SOLUTION INTRAMUSCULAR; INTRAVENOUS at 11:11

## 2024-11-22 RX ADMIN — SODIUM CHLORIDE 1000 ML: 9 INJECTION, SOLUTION INTRAVENOUS at 10:11

## 2024-11-22 RX ADMIN — ACETAMINOPHEN 1000 MG: 500 TABLET ORAL at 10:11

## 2024-11-22 RX ADMIN — MORPHINE SULFATE 2 MG: 4 INJECTION INTRAVENOUS at 10:11

## 2024-11-22 RX ADMIN — Medication 600 ML: at 10:11

## 2024-11-22 NOTE — Clinical Note
Diagnosis: Tachycardia [365064]   Reason for IP Medical Treatment  (Clinical interventions that can only be accomplished in the IP setting? ) :: biliary colic, cholelithiasis r/o cholecystitis   Plans for Post-Acute care--if anticipated (pick the single best option):: A. No post acute care anticipated at this time

## 2024-11-23 PROBLEM — K80.20 CHOLELITHIASIS AFFECTING PREGNANCY IN SECOND TRIMESTER, ANTEPARTUM: Status: ACTIVE | Noted: 2024-11-23

## 2024-11-23 PROBLEM — Z34.92: Status: ACTIVE | Noted: 2024-11-23

## 2024-11-23 PROBLEM — O26.612 CHOLELITHIASIS AFFECTING PREGNANCY IN SECOND TRIMESTER, ANTEPARTUM: Status: ACTIVE | Noted: 2024-11-23

## 2024-11-23 LAB
ALBUMIN SERPL BCP-MCNC: 2.5 G/DL (ref 3.5–5.2)
ALP SERPL-CCNC: 77 U/L (ref 40–150)
ALT SERPL W/O P-5'-P-CCNC: 22 U/L (ref 10–44)
ANION GAP SERPL CALC-SCNC: 7 MMOL/L (ref 8–16)
AST SERPL-CCNC: 27 U/L (ref 10–40)
BASOPHILS # BLD AUTO: 0.02 K/UL (ref 0–0.2)
BASOPHILS NFR BLD: 0.2 % (ref 0–1.9)
BILIRUB DIRECT SERPL-MCNC: 0.2 MG/DL (ref 0.1–0.3)
BILIRUB SERPL-MCNC: 0.4 MG/DL (ref 0.1–1)
BUN SERPL-MCNC: 3 MG/DL (ref 6–20)
CALCIUM SERPL-MCNC: 8.1 MG/DL (ref 8.7–10.5)
CHLORIDE SERPL-SCNC: 109 MMOL/L (ref 95–110)
CO2 SERPL-SCNC: 21 MMOL/L (ref 23–29)
CREAT SERPL-MCNC: 0.6 MG/DL (ref 0.5–1.4)
DIFFERENTIAL METHOD BLD: ABNORMAL
EOSINOPHIL # BLD AUTO: 0 K/UL (ref 0–0.5)
EOSINOPHIL NFR BLD: 0.2 % (ref 0–8)
ERYTHROCYTE [DISTWIDTH] IN BLOOD BY AUTOMATED COUNT: 14.1 % (ref 11.5–14.5)
EST. GFR  (NO RACE VARIABLE): >60 ML/MIN/1.73 M^2
GLUCOSE SERPL-MCNC: 73 MG/DL (ref 70–110)
HCT VFR BLD AUTO: 33.1 % (ref 37–48.5)
HGB BLD-MCNC: 11 G/DL (ref 12–16)
IMM GRANULOCYTES # BLD AUTO: 0.05 K/UL (ref 0–0.04)
IMM GRANULOCYTES NFR BLD AUTO: 0.5 % (ref 0–0.5)
LYMPHOCYTES # BLD AUTO: 1.4 K/UL (ref 1–4.8)
LYMPHOCYTES NFR BLD: 15.3 % (ref 18–48)
MCH RBC QN AUTO: 29.5 PG (ref 27–31)
MCHC RBC AUTO-ENTMCNC: 33.2 G/DL (ref 32–36)
MCV RBC AUTO: 89 FL (ref 82–98)
MONOCYTES # BLD AUTO: 0.6 K/UL (ref 0.3–1)
MONOCYTES NFR BLD: 6.7 % (ref 4–15)
NEUTROPHILS # BLD AUTO: 7.3 K/UL (ref 1.8–7.7)
NEUTROPHILS NFR BLD: 77.1 % (ref 38–73)
NRBC BLD-RTO: 0 /100 WBC
PLATELET # BLD AUTO: 230 K/UL (ref 150–450)
PMV BLD AUTO: 11 FL (ref 9.2–12.9)
POTASSIUM SERPL-SCNC: 3.8 MMOL/L (ref 3.5–5.1)
PROT SERPL-MCNC: 5.7 G/DL (ref 6–8.4)
RBC # BLD AUTO: 3.73 M/UL (ref 4–5.4)
SODIUM SERPL-SCNC: 137 MMOL/L (ref 136–145)
WBC # BLD AUTO: 9.43 K/UL (ref 3.9–12.7)

## 2024-11-23 PROCEDURE — 80076 HEPATIC FUNCTION PANEL: CPT | Performed by: COLON & RECTAL SURGERY

## 2024-11-23 PROCEDURE — 96376 TX/PRO/DX INJ SAME DRUG ADON: CPT

## 2024-11-23 PROCEDURE — G0378 HOSPITAL OBSERVATION PER HR: HCPCS

## 2024-11-23 PROCEDURE — 96361 HYDRATE IV INFUSION ADD-ON: CPT

## 2024-11-23 PROCEDURE — 85025 COMPLETE CBC W/AUTO DIFF WBC: CPT | Performed by: COLON & RECTAL SURGERY

## 2024-11-23 PROCEDURE — 25000003 PHARM REV CODE 250: Performed by: OBSTETRICS & GYNECOLOGY

## 2024-11-23 PROCEDURE — 80048 BASIC METABOLIC PNL TOTAL CA: CPT | Performed by: COLON & RECTAL SURGERY

## 2024-11-23 PROCEDURE — 36415 COLL VENOUS BLD VENIPUNCTURE: CPT | Performed by: COLON & RECTAL SURGERY

## 2024-11-23 PROCEDURE — 63600175 PHARM REV CODE 636 W HCPCS: Performed by: EMERGENCY MEDICINE

## 2024-11-23 PROCEDURE — 99222 1ST HOSP IP/OBS MODERATE 55: CPT | Mod: ,,, | Performed by: OBSTETRICS & GYNECOLOGY

## 2024-11-23 PROCEDURE — 63600175 PHARM REV CODE 636 W HCPCS: Performed by: OBSTETRICS & GYNECOLOGY

## 2024-11-23 RX ORDER — MORPHINE SULFATE 4 MG/ML
2 INJECTION, SOLUTION INTRAMUSCULAR; INTRAVENOUS EVERY 4 HOURS PRN
Status: DISCONTINUED | OUTPATIENT
Start: 2024-11-23 | End: 2024-11-23

## 2024-11-23 RX ORDER — PROCHLORPERAZINE EDISYLATE 5 MG/ML
5 INJECTION INTRAMUSCULAR; INTRAVENOUS EVERY 6 HOURS PRN
Status: DISCONTINUED | OUTPATIENT
Start: 2024-11-23 | End: 2024-11-25 | Stop reason: HOSPADM

## 2024-11-23 RX ORDER — SODIUM CHLORIDE 0.9 % (FLUSH) 0.9 %
10 SYRINGE (ML) INJECTION
Status: DISCONTINUED | OUTPATIENT
Start: 2024-11-23 | End: 2024-11-25 | Stop reason: HOSPADM

## 2024-11-23 RX ORDER — ONDANSETRON HYDROCHLORIDE 2 MG/ML
4 INJECTION, SOLUTION INTRAVENOUS EVERY 6 HOURS PRN
Status: DISCONTINUED | OUTPATIENT
Start: 2024-11-23 | End: 2024-11-25 | Stop reason: HOSPADM

## 2024-11-23 RX ORDER — SODIUM CHLORIDE 9 MG/ML
INJECTION, SOLUTION INTRAVENOUS CONTINUOUS
Status: DISCONTINUED | OUTPATIENT
Start: 2024-11-23 | End: 2024-11-24

## 2024-11-23 RX ORDER — ACETAMINOPHEN 325 MG/1
650 TABLET ORAL EVERY 6 HOURS PRN
Status: DISCONTINUED | OUTPATIENT
Start: 2024-11-23 | End: 2024-11-25 | Stop reason: HOSPADM

## 2024-11-23 RX ORDER — MORPHINE SULFATE 4 MG/ML
2 INJECTION, SOLUTION INTRAMUSCULAR; INTRAVENOUS
Status: COMPLETED | OUTPATIENT
Start: 2024-11-23 | End: 2024-11-23

## 2024-11-23 RX ADMIN — SODIUM CHLORIDE: 9 INJECTION, SOLUTION INTRAVENOUS at 10:11

## 2024-11-23 RX ADMIN — SODIUM CHLORIDE: 9 INJECTION, SOLUTION INTRAVENOUS at 05:11

## 2024-11-23 RX ADMIN — MORPHINE SULFATE 2 MG: 4 INJECTION INTRAVENOUS at 01:11

## 2024-11-23 RX ADMIN — SODIUM CHLORIDE: 9 INJECTION, SOLUTION INTRAVENOUS at 01:11

## 2024-11-23 RX ADMIN — MORPHINE SULFATE 2 MG: 4 INJECTION INTRAVENOUS at 02:11

## 2024-11-23 RX ADMIN — Medication 600 ML: at 06:11

## 2024-11-23 RX ADMIN — MORPHINE SULFATE 2 MG: 4 INJECTION INTRAVENOUS at 09:11

## 2024-11-23 RX ADMIN — SODIUM CHLORIDE: 9 INJECTION, SOLUTION INTRAVENOUS at 03:11

## 2024-11-23 NOTE — ASSESSMENT & PLAN NOTE
33yo F with likely symptomatic cholelithiasis during pregnancy    - No emergent surgical intervention required at this time.  Patient has no leukocytosis, no current right upper quadrant abdominal pain and equivocal imaging findings.  Given that her pain has improved dramatically since admission and has not been on antibiotics, I would favor that this is likely symptomatic cholelithiasis rather than cholecystitis.  Discussed with the patient that we should trial a diet to see if she can tolerate this without further pain to ensure that she was able to have adequate p.o. intake at home without pain prior to discharge.  - trial clear liquid diet for now  - would avoid giving antibiotics at this time.  If patient truly has cholecystitis, we will allow this to declare itself  - will closely follow

## 2024-11-23 NOTE — SUBJECTIVE & OBJECTIVE
This pregnancy has been complicated by low lying placenta.    OB History    Para Term  AB Living   2 1 0 0 0 1   SAB IAB Ectopic Multiple Live Births   0 0 0 0 1      # Outcome Date GA Lbr Devan/2nd Weight Sex Type Anes PTL Lv   2 Current            1 Para              Past Medical History:   Diagnosis Date    Abnormal glandular Papanicolaou smear of cervix 3/10/2014 10:04:41 AM    [facility] Historical - Gynecologic: Abnormal PAP Smear-lgsil    Unspecified chlamydial infection, in conditions classified elsewhere and of unspecified site 2016 4:57:49 PM    [facility] Historical - [facility]: Chlamydia-No Additional Notes     History reviewed. No pertinent surgical history.        Review of patient's allergies indicates:  No Known Allergies     Family History    None       Tobacco Use    Smoking status: Never    Smokeless tobacco: Never   Substance and Sexual Activity    Alcohol use: Not on file    Drug use: Never    Sexual activity: Yes     Partners: Male     Birth control/protection: Partner-Vasectomy     Review of Systems   Constitutional:  Negative for chills and fever.   Gastrointestinal:  Positive for abdominal pain and nausea.   Genitourinary:  Negative for dysuria, pelvic pain and vaginal bleeding.      Objective:     Vital Signs (Most Recent):  Temp: 98 °F (36.7 °C) (24 0030)  Pulse: 92 (24 0230)  Resp: 18 (24 0230)  BP: 118/72 (24 0230)  SpO2: 97 % (24 0230) Vital Signs (24h Range):  Temp:  [98 °F (36.7 °C)-98.1 °F (36.7 °C)] 98 °F (36.7 °C)  Pulse:  [] 92  Resp:  [18-22] 18  SpO2:  [97 %-100 %] 97 %  BP: (102-144)/(57-93) 118/72     Weight: 97.5 kg (215 lb)  Body mass index is 34.7 kg/m².     Physical Exam:   Constitutional: She is oriented to person, place, and time. No distress.    HENT:   Head: Normocephalic and atraumatic.      Cardiovascular:  Normal rate.             Pulmonary/Chest: Effort normal.        Abdominal: Soft. She exhibits no distension  and no mass. There is no rebound and no guarding.   Mild upper abdominal tenderness.             Musculoskeletal: No tenderness or edema.       Neurological: She is alert and oriented to person, place, and time.     Psychiatric: She has a normal mood and affect.             Significant Labs:  Lab Results   Component Value Date    HEPBSAG Non-reactive 09/13/2024       CBC:   Recent Labs   Lab 11/22/24 2146   WBC 12.43   RBC 4.08   HGB 11.8*   HCT 35.2*      MCV 86   MCH 28.9   MCHC 33.5     CMP:   Recent Labs   Lab 11/22/24 2146   GLU 93   CALCIUM 8.8   ALBUMIN 2.9*   PROT 6.5      K 3.4*   CO2 22*      BUN 4*   CREATININE 0.7   ALKPHOS 75   ALT 12   AST 14   BILITOT 0.2     I have personallly reviewed all pertinent lab results from the last 24 hours.    EXAMINATION:  US ABDOMEN LIMITED     CLINICAL HISTORY:  epigastric abd pain;     TECHNIQUE:  Limited ultrasound of the right upper quadrant of the abdomen (including pancreas, liver, gallbladder, common bile duct, and spleen) was performed.     COMPARISON:  None.     FINDINGS:  Distended gallbladder.  Gallbladder sludge and stones with 9 mm gallbladder neck stone.  Wall thickness measures 5 mm.  Sonographic Carter sign is reported as negative.  Common bile duct measures 6 mm.  No intrahepatic biliary ductal dilatation.     Unremarkable liver.     Unremarkable right kidney.     Pancreas is unremarkable, as visualized.     Patent IVC.     Impression:     Cholelithiasis with wall thickening, as above.  Negative sonographic Carter sign is reported.  Correlate for clinical evidence of acute cholecystitis.        Electronically signed by:Endy Galicia  Date:                                            11/23/2024  Time:                                           00:58

## 2024-11-23 NOTE — H&P
[facility]  Obstetrics  History & Physical    Patient Name: Lily Linda  MRN: [id number]  Admission Date: 2024  Primary Care Provider: Jesica, Primary Doctor    Subjective:     Principal Problem:Cholelithiasis affecting pregnancy in second trimester, antepartum    History of Present Illness:  34 y.o. female  at 21w5d EGA, presented to ED with c/o severe RUQ abdominal pain that started 2 hours after eating fried chicken. Pain has occurred intermittently since then.  Mild nausea as well.  Currently rates pain as 5 on pain scale.  No prior history of similar pain. No pregnancy concerns.  Fetal movement present.  No vaginal bleeding or fluid leakage.  No cramping or contractions.  Followed by Dr. Cora Stringer at [facility] for prenatal care.    This pregnancy has been complicated by low lying placenta.    OB History    Para Term  AB Living   2 1 0 0 0 1   SAB IAB Ectopic Multiple Live Births   0 0 0 0 1      # Outcome Date GA Lbr Devan/2nd Weight Sex Type Anes PTL Lv   2 Current            1 Para              Past Medical History:   Diagnosis Date    Abnormal glandular Papanicolaou smear of cervix 3/10/2014 10:04:41 AM    [facility] Historical - Gynecologic: Abnormal PAP Smear-lgsil    Unspecified chlamydial infection, in conditions classified elsewhere and of unspecified site 2016 4:57:49 PM    [facility] Historical - LWHA: Chlamydia-No Additional Notes     History reviewed. No pertinent surgical history.        Review of patient's allergies indicates:  No Known Allergies     Family History    None       Tobacco Use    Smoking status: Never    Smokeless tobacco: Never   Substance and Sexual Activity    Alcohol use: Not on file    Drug use: Never    Sexual activity: Yes     Partners: Male     Birth control/protection: Partner-Vasectomy     Review of Systems   Constitutional:  Negative for chills and fever.   Gastrointestinal:  Positive for abdominal pain and nausea.    Genitourinary:  Negative for dysuria, pelvic pain and vaginal bleeding.      Objective:     Vital Signs (Most Recent):  Temp: 98 °F (36.7 °C) (11/23/24 0030)  Pulse: 92 (11/23/24 0230)  Resp: 18 (11/23/24 0230)  BP: 118/72 (11/23/24 0230)  SpO2: 97 % (11/23/24 0230) Vital Signs (24h Range):  Temp:  [98 °F (36.7 °C)-98.1 °F (36.7 °C)] 98 °F (36.7 °C)  Pulse:  [] 92  Resp:  [18-22] 18  SpO2:  [97 %-100 %] 97 %  BP: (102-144)/(57-93) 118/72     Weight: 97.5 kg (215 lb)  Body mass index is 34.7 kg/m².     Physical Exam:   Constitutional: She is oriented to person, place, and time. No distress.    HENT:   Head: Normocephalic and atraumatic.      Cardiovascular:  Normal rate.             Pulmonary/Chest: Effort normal.        Abdominal: Soft. She exhibits no distension and no mass. There is no rebound and no guarding.   Mild upper abdominal tenderness.             Musculoskeletal: No tenderness or edema.       Neurological: She is alert and oriented to person, place, and time.     Psychiatric: She has a normal mood and affect.             Significant Labs:  Lab Results   Component Value Date    HEPBSAG Non-reactive 09/13/2024       CBC:   Recent Labs   Lab 11/22/24  2146   WBC 12.43   RBC 4.08   HGB 11.8*   HCT 35.2*      MCV 86   MCH 28.9   MCHC 33.5     CMP:   Recent Labs   Lab 11/22/24  2146   GLU 93   CALCIUM 8.8   ALBUMIN 2.9*   PROT 6.5      K 3.4*   CO2 22*      BUN 4*   CREATININE 0.7   ALKPHOS 75   ALT 12   AST 14   BILITOT 0.2     I have personallly reviewed all pertinent lab results from the last 24 hours.    EXAMINATION:  US ABDOMEN LIMITED     CLINICAL HISTORY:  epigastric abd pain;     TECHNIQUE:  Limited ultrasound of the right upper quadrant of the abdomen (including pancreas, liver, gallbladder, common bile duct, and spleen) was performed.     COMPARISON:  None.     FINDINGS:  Distended gallbladder.  Gallbladder sludge and stones with 9 mm gallbladder neck stone.  Wall thickness  measures 5 mm.  Sonographic Carter sign is reported as negative.  Common bile duct measures 6 mm.  No intrahepatic biliary ductal dilatation.     Unremarkable liver.     Unremarkable right kidney.     Pancreas is unremarkable, as visualized.     Patent IVC.     Impression:     Cholelithiasis with wall thickening, as above.  Negative sonographic Carter sign is reported.  Correlate for clinical evidence of acute cholecystitis.        Electronically signed by:Endy Galicia  Date:                                            2024  Time:                                           00:58  Assessment/Plan:     34 y.o. female  at 21w5d for:    * Cholelithiasis affecting pregnancy in second trimester, antepartum  Admit for observation. IV hydration.  General surgery consult.  MRCP scheduled today.        Michaela Barber MD  Obstetrics  [facility]

## 2024-11-23 NOTE — PLAN OF CARE
Problem:  Fall Injury Risk  Goal: Absence of Fall, Infant Drop and Related Injury  2024 170 by Sal Pena RN  Outcome: Progressing  2024 1224 by Sal Pena RN  Outcome: Progressing     Problem: Adult Inpatient Plan of Care  Goal: Plan of Care Review  2024 170 by Sal Pena RN  Outcome: Progressing  2024 122 by Sal Pena RN  Outcome: Progressing  Goal: Patient-Specific Goal (Individualized)  2024 170 by Sal Pena RN  Outcome: Progressing  2024 122 by Sal Pena RN  Outcome: Progressing  Goal: Absence of Hospital-Acquired Illness or Injury  2024 by Sal Pena RN  Outcome: Progressing  2024 122 by Sal Pena RN  Outcome: Progressing  Goal: Optimal Comfort and Wellbeing  2024 170 by Sal Pena RN  Outcome: Progressing  2024 1224 by Sal Pena RN  Outcome: Progressing  Goal: Readiness for Transition of Care  2024 170 by Sal Pena RN  Outcome: Progressing  2024 122 by Sal Pena RN  Outcome: Progressing

## 2024-11-23 NOTE — HPI
33yo F without significant PMHx who is 21 weeks pregnant who presented to the emergency room with a multi hour history of right upper quadrant abdominal pain.  Patient states that she started having pain after eating fried chicken with associated nausea.  He had never had pain like this before.  Workup in the emergency room was concerning for an upper quadrant ultrasound with some mild gallbladder wall thickening and gallstones present especially 1 of the neck.  She was admitted to the OBGYN service and surgery was consulted for evaluation.  Patient does take Protonix daily.  Reports that her pain has improved since admission.  Denies any current right upper quadrant abdominal pain.  Only reports some mild discomfort in the epigastric region.  Denies any fever, chills, hematochezia or melena.  Denies any previous abdominal surgical history.

## 2024-11-23 NOTE — ED PROVIDER NOTES
SCRIBE #1 NOTE: I, Armida Avalos, am scribing for, and in the presence of, Abner Couch Jr., MD. I have scribed the entire note.       History     Chief Complaint   Patient presents with    Abdominal Pain     Upper abdominal pain, + nausea x 30 minutes pta. Mild pain at present. 21 weeks pregnant.      Review of patient's allergies indicates:  No Known Allergies      History of Present Illness     HPI    11/22/2024, 9:20 PM  History obtained from the patient      History of Present Illness: Lily Linda is a 34 y.o. female patient with a PMHx of unspecified chlamydial infection who presents to the Emergency Department for evaluation of upper abdominal pain which onset approximately 1 hour ago. Pt states that her pain is sharp, severe, non-radiating, and intermittent. She has never experienced this type of pain before. Pt states that she ate fried chicken approximately 3 hours ago. She is 21 weeks pregnant. Pt takes Protonix daily. Pt also c/o generalized myalgias and diaphoresis. Pt states that prior to coming to the ED, she had an episode of pain where she became diaphoretic and felt like she was going to pass out. No mitigating or exacerbating factors reported. Associated sxs include generalized myalgias and diaphoresis. Patient denies any cough, congestion, belching, dysuria, hematuria, hematochezia, nausea, and all other sxs at this time. No prior Tx reported. No further complaints or concerns at this time.       Arrival mode: Personal vehicle    PCP: No, Primary Doctor        Past Medical History:  Past Medical History:   Diagnosis Date    Abnormal glandular Papanicolaou smear of cervix 3/10/2014 10:04:41 AM    [facility] Historical - Gynecologic: Abnormal PAP Smear-lgsil    Unspecified chlamydial infection, in conditions classified elsewhere and of unspecified site 5/17/2016 4:57:49 PM    [facility] Historical - LWHA: Chlamydia-No Additional Notes       Past Surgical History:  History reviewed.  No pertinent surgical history.      Family History:  No family history on file.    Social History:  Social History     Tobacco Use    Smoking status: Never    Smokeless tobacco: Never   Substance and Sexual Activity    Alcohol use: Not on file    Drug use: Never    Sexual activity: Yes     Partners: Male     Birth control/protection: Partner-Vasectomy        Review of Systems     Review of Systems   Constitutional:  Positive for diaphoresis. Negative for fever.   HENT:  Negative for congestion and sore throat.    Respiratory:  Negative for cough and shortness of breath.    Cardiovascular:  Negative for chest pain.   Gastrointestinal:  Positive for abdominal pain (sharp, upper). Negative for blood in stool and nausea.        (-) belching   Genitourinary:  Negative for dysuria and hematuria.   Musculoskeletal:  Positive for myalgias (generalized). Negative for back pain.   Skin:  Negative for rash.   Neurological:  Negative for weakness.   Hematological:  Does not bruise/bleed easily.      Physical Exam     Initial Vitals [11/22/24 2037]   BP Pulse Resp Temp SpO2   117/74 100 20 98.1 °F (36.7 °C) 98 %      MAP       --          Physical Exam  Nursing Notes and Vital Signs Reviewed.  Constitutional: Patient is in no acute distress. Well-developed and well-nourished.  Head: Atraumatic. Normocephalic.  Eyes: PERRL. EOM intact. Conjunctivae are not pale. No scleral icterus.  ENT: Mucous membranes are moist. Oropharynx is clear and symmetric.    Neck: Supple. Full ROM. No lymphadenopathy.  Cardiovascular: Regular rate. Regular rhythm. No murmurs, rubs, or gallops. Distal pulses are 2+ and symmetric.  Pulmonary/Chest: No respiratory distress. Clear to auscultation bilaterally. No wheezing or rales.  Abdominal: Gravid uterus consistent with dates.  There is mild epigastric abdominal tenderness.  No rebound, guarding, or rigidity. Good bowel sounds.  Genitourinary: No CVA tenderness  Musculoskeletal: Moves all extremities. No  "obvious deformities. No edema. No calf tenderness.  Skin: Warm and dry.  Neurological:  Alert, awake, and appropriate.  Normal speech.  No acute focal neurological deficits are appreciated.  Psychiatric: Normal affect. Good eye contact. Appropriate in content.     ED Course   Procedures  ED Vital Signs:  Vitals:    11/22/24 2037 11/22/24 2100 11/22/24 2115 11/22/24 2130   BP: 117/74 107/60 104/64 107/67   Pulse: 100 89 89 105   Resp: 20 20 20 20   Temp: 98.1 °F (36.7 °C)      TempSrc: Oral      SpO2: 98% 100% 100% 100%   Weight: 97.5 kg (215 lb)      Height: 5' 6" (1.676 m)       11/22/24 2215 11/22/24 2252 11/22/24 2330 11/23/24 0030   BP: (!) 135/93 (!) 144/70 125/61 (!) 102/57   Pulse: 96 102 95 90   Resp: 20 (!) 22 20 18   Temp:    98 °F (36.7 °C)   TempSrc:    Oral   SpO2: 100% 100% 100% 97%   Weight:       Height:        11/23/24 0208 11/23/24 0230 11/23/24 0300 11/23/24 0330   BP: 113/69 118/72 119/73 115/70   Pulse: 106 92 84 86   Resp: 20 18 18 18   Temp:       TempSrc:       SpO2: 98% 97% 98% 98%   Weight:       Height:        11/23/24 0400   BP:    Pulse: 83   Resp: 18   Temp:    TempSrc:    SpO2: 99%   Weight:    Height:        Abnormal Lab Results:  Labs Reviewed   CBC W/ AUTO DIFFERENTIAL - Abnormal       Result Value    WBC 12.43      RBC 4.08      Hemoglobin 11.8 (*)     Hematocrit 35.2 (*)     MCV 86      MCH 28.9      MCHC 33.5      RDW 13.8      Platelets 259      MPV 11.0      Immature Granulocytes 0.5      Gran # (ANC) 9.4 (*)     Immature Grans (Abs) 0.06 (*)     Lymph # 2.0      Mono # 0.9      Eos # 0.1      Baso # 0.02      nRBC 0      Gran % 75.5 (*)     Lymph % 15.7 (*)     Mono % 7.5      Eosinophil % 0.6      Basophil % 0.2      Differential Method Automated     COMPREHENSIVE METABOLIC PANEL - Abnormal    Sodium 139      Potassium 3.4 (*)     Chloride 107      CO2 22 (*)     Glucose 93      BUN 4 (*)     Creatinine 0.7      Calcium 8.8      Total Protein 6.5      Albumin 2.9 (*)     " Total Bilirubin 0.2      Alkaline Phosphatase 75      AST 14      ALT 12      eGFR >60      Anion Gap 10     URINALYSIS, REFLEX TO URINE CULTURE - Abnormal    Specimen UA Urine, Clean Catch      Color, UA Yellow      Appearance, UA Hazy (*)     pH, UA 8.0      Specific Gravity, UA 1.020      Protein, UA 1+ (*)     Glucose, UA 3+ (*)     Ketones, UA Negative      Bilirubin (UA) Negative      Occult Blood UA Negative      Nitrite, UA Negative      Urobilinogen, UA Negative      Leukocytes, UA Negative      Narrative:     Specimen Source->Urine   URINALYSIS MICROSCOPIC - Abnormal    RBC, UA 3      WBC, UA 20 (*)     WBC Clumps, UA Many (*)     Bacteria Many (*)     Yeast, UA None      Squam Epithel, UA 7      Hyaline Casts, UA 5 (*)     Amorphous, UA Rare      Microscopic Comment SEE COMMENT      Narrative:     Specimen Source->Urine   INFLUENZA A & B BY MOLECULAR    Influenza A, Molecular Negative      Influenza B, Molecular Negative      Flu A & B Source Nasal swab     CULTURE, URINE   LIPASE    Lipase 6     SARS-COV-2 RNA AMPLIFICATION, QUAL    SARS-CoV-2 RNA, Amplification, Qual Negative     TROPONIN I    Troponin I <0.006          All Lab Results:  Results for orders placed or performed during the hospital encounter of 11/22/24   CBC W/ AUTO DIFFERENTIAL    Collection Time: 11/22/24  9:46 PM   Result Value Ref Range    WBC 12.43 3.90 - 12.70 K/uL    RBC 4.08 4.00 - 5.40 M/uL    Hemoglobin 11.8 (L) 12.0 - 16.0 g/dL    Hematocrit 35.2 (L) 37.0 - 48.5 %    MCV 86 82 - 98 fL    MCH 28.9 27.0 - 31.0 pg    MCHC 33.5 32.0 - 36.0 g/dL    RDW 13.8 11.5 - 14.5 %    Platelets 259 150 - 450 K/uL    MPV 11.0 9.2 - 12.9 fL    Immature Granulocytes 0.5 0.0 - 0.5 %    Gran # (ANC) 9.4 (H) 1.8 - 7.7 K/uL    Immature Grans (Abs) 0.06 (H) 0.00 - 0.04 K/uL    Lymph # 2.0 1.0 - 4.8 K/uL    Mono # 0.9 0.3 - 1.0 K/uL    Eos # 0.1 0.0 - 0.5 K/uL    Baso # 0.02 0.00 - 0.20 K/uL    nRBC 0 0 /100 WBC    Gran % 75.5 (H) 38.0 - 73.0 %     Lymph % 15.7 (L) 18.0 - 48.0 %    Mono % 7.5 4.0 - 15.0 %    Eosinophil % 0.6 0.0 - 8.0 %    Basophil % 0.2 0.0 - 1.9 %    Differential Method Automated    Comp. Metabolic Panel    Collection Time: 11/22/24  9:46 PM   Result Value Ref Range    Sodium 139 136 - 145 mmol/L    Potassium 3.4 (L) 3.5 - 5.1 mmol/L    Chloride 107 95 - 110 mmol/L    CO2 22 (L) 23 - 29 mmol/L    Glucose 93 70 - 110 mg/dL    BUN 4 (L) 6 - 20 mg/dL    Creatinine 0.7 0.5 - 1.4 mg/dL    Calcium 8.8 8.7 - 10.5 mg/dL    Total Protein 6.5 6.0 - 8.4 g/dL    Albumin 2.9 (L) 3.5 - 5.2 g/dL    Total Bilirubin 0.2 0.1 - 1.0 mg/dL    Alkaline Phosphatase 75 40 - 150 U/L    AST 14 10 - 40 U/L    ALT 12 10 - 44 U/L    eGFR >60 >60 mL/min/1.73 m^2    Anion Gap 10 8 - 16 mmol/L   Lipase    Collection Time: 11/22/24  9:46 PM   Result Value Ref Range    Lipase 6 4 - 60 U/L   Urinalysis, Reflex to Urine Culture Urine, Clean Catch    Collection Time: 11/22/24  9:48 PM    Specimen: Urine   Result Value Ref Range    Specimen UA Urine, Clean Catch     Color, UA Yellow Yellow, Straw, Tran    Appearance, UA Hazy (A) Clear    pH, UA 8.0 5.0 - 8.0    Specific Gravity, UA 1.020 1.005 - 1.030    Protein, UA 1+ (A) Negative    Glucose, UA 3+ (A) Negative    Ketones, UA Negative Negative    Bilirubin (UA) Negative Negative    Occult Blood UA Negative Negative    Nitrite, UA Negative Negative    Urobilinogen, UA Negative <2.0 EU/dL    Leukocytes, UA Negative Negative   Urinalysis Microscopic    Collection Time: 11/22/24  9:48 PM   Result Value Ref Range    RBC, UA 3 0 - 4 /hpf    WBC, UA 20 (H) 0 - 5 /hpf    WBC Clumps, UA Many (A) None-Rare    Bacteria Many (A) None-Occ /hpf    Yeast, UA None None    Squam Epithel, UA 7 /hpf    Hyaline Casts, UA 5 (A) 0-1/lpf /lpf    Amorphous, UA Rare None-Moderate    Microscopic Comment SEE COMMENT    Troponin I    Collection Time: 11/22/24  9:51 PM   Result Value Ref Range    Troponin I <0.006 0.000 - 0.026 ng/mL   Influenza A & B by  Molecular    Collection Time: 11/22/24  9:57 PM    Specimen: Nasopharyngeal Swab   Result Value Ref Range    Influenza A, Molecular Negative Negative    Influenza B, Molecular Negative Negative    Flu A & B Source Nasal swab    COVID-19 Rapid Screening    Collection Time: 11/22/24  9:57 PM   Result Value Ref Range    SARS-CoV-2 RNA, Amplification, Qual Negative Negative        Imaging Results:  Imaging Results              US Abdomen Limited (Final result)  Result time 11/23/24 00:58:38      Final result by Endy Galicia MD (11/23/24 00:58:38)                   Impression:      Cholelithiasis with wall thickening, as above.  Negative sonographic Carter sign is reported.  Correlate for clinical evidence of acute cholecystitis.      Electronically signed by: Endy Galicia  Date:    11/23/2024  Time:    00:58               Narrative:    EXAMINATION:  US ABDOMEN LIMITED    CLINICAL HISTORY:  epigastric abd pain;    TECHNIQUE:  Limited ultrasound of the right upper quadrant of the abdomen (including pancreas, liver, gallbladder, common bile duct, and spleen) was performed.    COMPARISON:  None.    FINDINGS:  Distended gallbladder.  Gallbladder sludge and stones with 9 mm gallbladder neck stone.  Wall thickness measures 5 mm.  Sonographic Carter sign is reported as negative.  Common bile duct measures 6 mm.  No intrahepatic biliary ductal dilatation.    Unremarkable liver.    Unremarkable right kidney.    Pancreas is unremarkable, as visualized.    Patent IVC.                                       The EKG was ordered, reviewed, and independently interpreted by the ED provider.  Interpretation time: 21:52  Rate: 92 BPM  Rhythm: normal sinus rhythm  Interpretation: No acute ST changes. No STEMI.           The Emergency Provider reviewed the vital signs and test results, which are outlined above.     ED Discussion     10:47 PM: Re-evaluated pt. Pt is complaining of worsening epigastric abdominal tenderness.   "Khoa will order an US and morphine and will continue to monitor  the pt closely.    11:21 PM: Re-evaluated pt. Pt's pain is improving but is still present. Pt is declining additional pain medication.     12:09 AM: Discussed pt's case with Dr. Hernandez (Colon and Rectal Surgery) who states that if pt's pain improves and there is no cholecystitis, he does not believe pt needs to be admitted.    1:39 AM: Discussed pt's case with Dr. Hernandez (Colon and Rectal Surgery) who recommends placing pt under observation until an MRI/MRCP can be performed in the AM.    2:41 AM: Discussed case with Sunil (OB/GYN). Dr. Barber agrees with current care and management of pt and accepts admission.   Admitting Service: OB  Admitting Physician: Dr. Barber  Admit to: observation     35yo F who is 21 wga c/o epigastric/RUQ abd pain pta after eating fried chicken. prelim u/s report concerning for stone in neck of gallbladder and biliary sludge. wbc, t bili, lfts, alk phos and lipase wnl. given tylenol, zofran, morphine and ivf. pain improved but still present. rad results of u/s: "FINDINGS:  Distended gallbladder. Gallbladder sludge and stones with 9 mm gallbladder neck stone. Wall thickness measures 5 mm. Sonographic Carter sign is reported as negative. Common bile duct measures 6 mm. No intrahepatic biliary ductal dilatation.  Unremarkable liver.  Unremarkable right kidney.  Pancreas is unremarkable, as visualized.  Patent IVC.    Impression:  Cholelithiasis with wall thickening, as above. Negative sonographic Carter sign is reported. Correlate for clinical evidence of acute cholecystitis."    discussed c Dr. Hernandez and he recommends MRI/MRCP to further r/o cholecystitis. I'm being told we cannot get MRCP here at night.  admission for biliary colic, cholelithiasis, r/o cholecystitis      ED Course as of 11/23/24 0538   Fri Nov 22, 2024 2156 Rhythm strip reviewed. Nsr, no stemi. 92bpm [LV]      ED Course User Index  [LV] Khoa, " Abner Morales MD     Medical Decision Making  Amount and/or Complexity of Data Reviewed  Labs: ordered. Decision-making details documented in ED Course.  Radiology: ordered. Decision-making details documented in ED Course.  ECG/medicine tests: ordered and independent interpretation performed. Decision-making details documented in ED Course.    Risk  OTC drugs.  Prescription drug management.  Parenteral controlled substances.  Decision regarding hospitalization.  Risk Details: OTC drugs, prescription drugs and controlled substances considered.  Due to patient's symptoms improving and pain controlled pain medications ordered appropriately.  Differential diagnosis;  Gastroenteritis, Bowel obstruction, Colitis, Diverticulitis, Cholecystitis, Appendicitis, Perforated bowel, Herniation, Infectious etiology, UTI, Pyelonephritis,  Biliary obstruction, kidney stone                   ED Medication(s):  Medications   electrolytes-dextrose (Pedialyte) oral solution 600 mL (600 mLs Oral Not Given 11/23/24 0230)   sodium chloride 0.9% flush 10 mL (has no administration in time range)   acetaminophen tablet 650 mg (has no administration in time range)   ondansetron injection 4 mg (has no administration in time range)   prochlorperazine injection Soln 5 mg (has no administration in time range)   0.9% NaCl infusion ( Intravenous New Bag 11/23/24 0531)   morphine injection 2 mg (has no administration in time range)   sodium chloride 0.9% bolus 1,000 mL 1,000 mL (0 mLs Intravenous Stopped 11/22/24 2309)   acetaminophen tablet 1,000 mg (1,000 mg Oral Given 11/22/24 2208)   morphine injection 2 mg (2 mg Intravenous Given 11/22/24 2252)   ondansetron injection 4 mg (4 mg Intravenous Given 11/22/24 2253)   cefTRIAXone injection 1 g (1 g Intravenous Given 11/22/24 2301)   morphine injection 2 mg (2 mg Intravenous Given 11/23/24 0208)       Current Discharge Medication List                  Scribe Attestation:   Scribe #1: I performed the above  scribed service and the documentation accurately describes the services I performed. I attest to the accuracy of the note.     Attending:   Physician Attestation Statement for Scribe #1: I, Abner Couch Jr., MD, personally performed the services described in this documentation, as scribed by Armida Avalos, in my presence, and it is both accurate and complete.       Scribe Attestation:   Scribe #1: I performed the above scribed service and the documentation accurately describes the services I performed. I attest to the accuracy of the note.         Clinical Impression       ICD-10-CM ICD-9-CM   1. Biliary colic  K80.50 574.20   2. Tachycardia  R00.0 785.0   3. Calculus of gallbladder with cholecystitis without biliary obstruction, unspecified cholecystitis acuity  K80.10 574.10       Disposition:   Disposition: Placed in Observation  Condition: Stable        Abner Couch Jr., MD  11/23/24 0538

## 2024-11-23 NOTE — HPI
34 y.o. female  at 21w5d EGA, presented to ED with c/o severe RUQ abdominal pain that started 2 hours after eating fried chicken. Pain has occurred intermittently since then.  Mild nausea as well.  Currently rates pain as 5 on pain scale.  No prior history of similar pain. No pregnancy concerns.  Fetal movement present.  No vaginal bleeding or fluid leakage.  No cramping or contractions.  Followed by Dr. Cora Stringer at [facility] for prenatal care.

## 2024-11-23 NOTE — CONSULTS
[facility]  General Surgery  Consult Note    Patient Name: Lily Linda  MRN: [id number]  Code Status: Full Code  Admission Date: 11/22/2024  Hospital Length of Stay: 1 days  Attending Physician: Michaela Barber,*  Primary Care Provider: Jesica, Primary Doctor    Patient information was obtained from patient, past medical records, ER records, and primary team.     Inpatient consult to General surgery  Consult performed by: Wm Hernandez MD  Consult ordered by: Michaela Barber MD        Subjective:     Principal Problem: Cholelithiasis affecting pregnancy in second trimester, antepartum    History of Present Illness: 35yo F without significant PMHx who is 21 weeks pregnant who presented to the emergency room with a multi hour history of right upper quadrant abdominal pain.  Patient states that she started having pain after eating fried chicken with associated nausea.  He had never had pain like this before.  Workup in the emergency room was concerning for an upper quadrant ultrasound with some mild gallbladder wall thickening and gallstones present especially 1 of the neck.  She was admitted to the OBGYN service and surgery was consulted for evaluation.  Patient does take Protonix daily.  Reports that her pain has improved since admission.  Denies any current right upper quadrant abdominal pain.  Only reports some mild discomfort in the epigastric region.  Denies any fever, chills, hematochezia or melena.  Denies any previous abdominal surgical history.    No current facility-administered medications on file prior to encounter.     Current Outpatient Medications on File Prior to Encounter   Medication Sig    aspirin 81 MG Chew Take 1 tablet (81 mg total) by mouth once daily.    ergocalciferol (ERGOCALCIFEROL) 50,000 unit Cap Take 1 capsule (50,000 Units total) by mouth every 7 days.    metFORMIN (GLUCOPHAGE-XR) 500 MG ER 24hr tablet Take 1 tablet (500 mg total) by mouth 2 (two) times  daily with meals. (Patient not taking: Reported on 8/9/2022)    ondansetron (ZOFRAN) 4 MG tablet Take 1 tablet (4 mg total) by mouth 2 (two) times daily.    pantoprazole (PROTONIX) 20 MG tablet Take 1 tablet (20 mg total) by mouth once daily.    promethazine (PHENERGAN) 25 MG tablet Take 1 tablet (25 mg total) by mouth every 6 (six) hours as needed for Nausea (can cause drowsiness).       Review of patient's allergies indicates:  No Known Allergies    Past Medical History:   Diagnosis Date    Abnormal glandular Papanicolaou smear of cervix 3/10/2014 10:04:41 AM    [facility] Historical - Gynecologic: Abnormal PAP Smear-lgsil    Unspecified chlamydial infection, in conditions classified elsewhere and of unspecified site 5/17/2016 4:57:49 PM    [facility] Historical - [facility]: Chlamydia-No Additional Notes     History reviewed. No pertinent surgical history.  Family History    None       Tobacco Use    Smoking status: Never    Smokeless tobacco: Never   Substance and Sexual Activity    Alcohol use: Not on file    Drug use: Never    Sexual activity: Yes     Partners: Male     Birth control/protection: Partner-Vasectomy     Review of Systems   Constitutional:  Negative for activity change, appetite change, chills, fatigue, fever and unexpected weight change.   HENT:  Negative for congestion, ear pain, sore throat and trouble swallowing.    Eyes:  Negative for pain, redness and itching.   Respiratory:  Negative for cough, shortness of breath and wheezing.    Cardiovascular:  Negative for chest pain, palpitations and leg swelling.   Gastrointestinal:  Positive for abdominal pain. Negative for abdominal distention, anal bleeding, blood in stool, constipation, diarrhea, rectal pain and vomiting.   Endocrine: Negative for cold intolerance, heat intolerance and polyuria.   Genitourinary:  Negative for dysuria, flank pain, frequency and hematuria.   Musculoskeletal:  Negative for gait problem, joint swelling and neck pain.    Skin:  Negative for color change, rash and wound.   Allergic/Immunologic: Negative for environmental allergies and immunocompromised state.   Neurological:  Negative for dizziness, speech difficulty, weakness and numbness.   Psychiatric/Behavioral:  Negative for agitation, confusion and hallucinations.      Objective:     Vital Signs (Most Recent):  Temp: 98.2 °F (36.8 °C) (11/23/24 1201)  Pulse: 88 (11/23/24 1201)  Resp: 18 (11/23/24 1326)  BP: 119/62 (11/23/24 1201)  SpO2: 99 % (11/23/24 1413) Vital Signs (24h Range):  Temp:  [98 °F (36.7 °C)-98.4 °F (36.9 °C)] 98.2 °F (36.8 °C)  Pulse:  [] 88  Resp:  [16-22] 18  SpO2:  [97 %-100 %] 99 %  BP: (102-144)/(57-93) 119/62     Weight: 97.5 kg (215 lb)  Body mass index is 34.7 kg/m².     Physical Exam  Constitutional:       Appearance: She is well-developed.   HENT:      Head: Normocephalic and atraumatic.   Eyes:      Conjunctiva/sclera: Conjunctivae normal.   Neck:      Thyroid: No thyromegaly.   Cardiovascular:      Rate and Rhythm: Normal rate and regular rhythm.   Pulmonary:      Effort: Pulmonary effort is normal. No respiratory distress.   Abdominal:      Comments: Soft, nondistended, very mild TTP epigastric; no RUQ TTP; no Carter's sign   Musculoskeletal:         General: No tenderness. Normal range of motion.      Cervical back: Normal range of motion.   Skin:     General: Skin is warm and dry.      Capillary Refill: Capillary refill takes less than 2 seconds.      Findings: No rash.   Neurological:      Mental Status: She is alert and oriented to person, place, and time.            I have reviewed all pertinent lab results within the past 24 hours.  CBC:   Recent Labs   Lab 11/23/24  1212   WBC 9.43   RBC 3.73*   HGB 11.0*   HCT 33.1*      MCV 89   MCH 29.5   MCHC 33.2     BMP:   Recent Labs   Lab 11/23/24  1212   GLU 73      K 3.8      CO2 21*   BUN 3*   CREATININE 0.6   CALCIUM 8.1*     CMP:   Recent Labs   Lab 11/23/24  1212   GLU  73   CALCIUM 8.1*   ALBUMIN 2.5*   PROT 5.7*      K 3.8   CO2 21*      BUN 3*   CREATININE 0.6   ALKPHOS 77   ALT 22   AST 27   BILITOT 0.4     LFTs:   Recent Labs   Lab 11/23/24  1212   ALT 22   AST 27   ALKPHOS 77   BILITOT 0.4   PROT 5.7*   ALBUMIN 2.5*     Significant Diagnostics:  I have reviewed all pertinent imaging results/findings within the past 24 hours.    US:  FINDINGS:  Distended gallbladder.  Gallbladder sludge and stones with 9 mm gallbladder neck stone.  Wall thickness measures 5 mm.  Sonographic Carter sign is reported as negative.  Common bile duct measures 6 mm.  No intrahepatic biliary ductal dilatation.     Unremarkable liver.     Unremarkable right kidney.     Pancreas is unremarkable, as visualized.     Patent IVC.     Impression:     Cholelithiasis with wall thickening, as above.  Negative sonographic Carter sign is reported.         MRCP:  FINDINGS:  Lung bases are clear.     Liver, spleen, and pancreas appear unremarkable.     No signal loss on in and out of phase imaging.     Mild right hydronephrosis.  It is unclear if this is related to physiologic hydronephrosis in pregnancy.  Further evaluation as needed.  No left hydronephrosis.     Visualized portion of the bowel appears unremarkable.     Minimal gallbladder wall thickening of unclear significance.  No local fat stranding.  Mild cholelithiasis.     Intra and extrahepatic biliary ductal dilation without convincing choledocholithiasis.  Correlation for recently passed stone or other etiology of biliary ductal dilation.  Common bile duct measures up to 11 mm.     Impression:     Biliary ductal dilation of unclear etiology.     No choledocholithiasis.     Mild gallbladder wall thickening without local fat stranding, not specifically favoring acute cholecystitis.  Further evaluation as needed.     Details as above.  Assessment/Plan:     * Cholelithiasis affecting pregnancy in second trimester, antepartum  33yo F with likely  symptomatic cholelithiasis during pregnancy    - No emergent surgical intervention required at this time.  Patient has no leukocytosis, no current right upper quadrant abdominal pain and equivocal imaging findings.  Given that her pain has improved dramatically since admission and has not been on antibiotics, I would favor that this is likely symptomatic cholelithiasis rather than cholecystitis.  Discussed with the patient that we should trial a diet to see if she can tolerate this without further pain to ensure that she was able to have adequate p.o. intake at home without pain prior to discharge.  - trial clear liquid diet for now  - would avoid giving antibiotics at this time.  If patient truly has cholecystitis, we will allow this to declare itself  - will closely follow    Intrauterine normal pregnancy, second trimester  Care per OBGYN      VTE Risk Mitigation (From admission, onward)           Ordered     IP VTE HIGH RISK PATIENT  Once         11/23/24 0322     Place sequential compression device  Until discontinued         11/23/24 0322                    Thank you for your consult. I will follow-up with patient. Please contact us if you have any additional questions.    Wm Hernandez MD  General Surgery  [facility]

## 2024-11-23 NOTE — SUBJECTIVE & OBJECTIVE
No current facility-administered medications on file prior to encounter.     Current Outpatient Medications on File Prior to Encounter   Medication Sig    aspirin 81 MG Chew Take 1 tablet (81 mg total) by mouth once daily.    ergocalciferol (ERGOCALCIFEROL) 50,000 unit Cap Take 1 capsule (50,000 Units total) by mouth every 7 days.    metFORMIN (GLUCOPHAGE-XR) 500 MG ER 24hr tablet Take 1 tablet (500 mg total) by mouth 2 (two) times daily with meals. (Patient not taking: Reported on 8/9/2022)    ondansetron (ZOFRAN) 4 MG tablet Take 1 tablet (4 mg total) by mouth 2 (two) times daily.    pantoprazole (PROTONIX) 20 MG tablet Take 1 tablet (20 mg total) by mouth once daily.    promethazine (PHENERGAN) 25 MG tablet Take 1 tablet (25 mg total) by mouth every 6 (six) hours as needed for Nausea (can cause drowsiness).       Review of patient's allergies indicates:  No Known Allergies    Past Medical History:   Diagnosis Date    Abnormal glandular Papanicolaou smear of cervix 3/10/2014 10:04:41 AM    [facility] Historical - Gynecologic: Abnormal PAP Smear-lgsil    Unspecified chlamydial infection, in conditions classified elsewhere and of unspecified site 5/17/2016 4:57:49 PM    [facility] Historical - [facility]: Chlamydia-No Additional Notes     History reviewed. No pertinent surgical history.  Family History    None       Tobacco Use    Smoking status: Never    Smokeless tobacco: Never   Substance and Sexual Activity    Alcohol use: Not on file    Drug use: Never    Sexual activity: Yes     Partners: Male     Birth control/protection: Partner-Vasectomy     Review of Systems   Constitutional:  Negative for activity change, appetite change, chills, fatigue, fever and unexpected weight change.   HENT:  Negative for congestion, ear pain, sore throat and trouble swallowing.    Eyes:  Negative for pain, redness and itching.   Respiratory:  Negative for cough, shortness of breath and wheezing.    Cardiovascular:  Negative for  chest pain, palpitations and leg swelling.   Gastrointestinal:  Positive for abdominal pain. Negative for abdominal distention, anal bleeding, blood in stool, constipation, diarrhea, rectal pain and vomiting.   Endocrine: Negative for cold intolerance, heat intolerance and polyuria.   Genitourinary:  Negative for dysuria, flank pain, frequency and hematuria.   Musculoskeletal:  Negative for gait problem, joint swelling and neck pain.   Skin:  Negative for color change, rash and wound.   Allergic/Immunologic: Negative for environmental allergies and immunocompromised state.   Neurological:  Negative for dizziness, speech difficulty, weakness and numbness.   Psychiatric/Behavioral:  Negative for agitation, confusion and hallucinations.      Objective:     Vital Signs (Most Recent):  Temp: 98.2 °F (36.8 °C) (11/23/24 1201)  Pulse: 88 (11/23/24 1201)  Resp: 18 (11/23/24 1326)  BP: 119/62 (11/23/24 1201)  SpO2: 99 % (11/23/24 1413) Vital Signs (24h Range):  Temp:  [98 °F (36.7 °C)-98.4 °F (36.9 °C)] 98.2 °F (36.8 °C)  Pulse:  [] 88  Resp:  [16-22] 18  SpO2:  [97 %-100 %] 99 %  BP: (102-144)/(57-93) 119/62     Weight: 97.5 kg (215 lb)  Body mass index is 34.7 kg/m².     Physical Exam  Constitutional:       Appearance: She is well-developed.   HENT:      Head: Normocephalic and atraumatic.   Eyes:      Conjunctiva/sclera: Conjunctivae normal.   Neck:      Thyroid: No thyromegaly.   Cardiovascular:      Rate and Rhythm: Normal rate and regular rhythm.   Pulmonary:      Effort: Pulmonary effort is normal. No respiratory distress.   Abdominal:      Comments: Soft, nondistended, very mild TTP epigastric; no RUQ TTP; no Carter's sign   Musculoskeletal:         General: No tenderness. Normal range of motion.      Cervical back: Normal range of motion.   Skin:     General: Skin is warm and dry.      Capillary Refill: Capillary refill takes less than 2 seconds.      Findings: No rash.   Neurological:      Mental Status: She  is alert and oriented to person, place, and time.            I have reviewed all pertinent lab results within the past 24 hours.  CBC:   Recent Labs   Lab 11/23/24  1212   WBC 9.43   RBC 3.73*   HGB 11.0*   HCT 33.1*      MCV 89   MCH 29.5   MCHC 33.2     BMP:   Recent Labs   Lab 11/23/24  1212   GLU 73      K 3.8      CO2 21*   BUN 3*   CREATININE 0.6   CALCIUM 8.1*     CMP:   Recent Labs   Lab 11/23/24  1212   GLU 73   CALCIUM 8.1*   ALBUMIN 2.5*   PROT 5.7*      K 3.8   CO2 21*      BUN 3*   CREATININE 0.6   ALKPHOS 77   ALT 22   AST 27   BILITOT 0.4     LFTs:   Recent Labs   Lab 11/23/24  1212   ALT 22   AST 27   ALKPHOS 77   BILITOT 0.4   PROT 5.7*   ALBUMIN 2.5*     Significant Diagnostics:  I have reviewed all pertinent imaging results/findings within the past 24 hours.    US:  FINDINGS:  Distended gallbladder.  Gallbladder sludge and stones with 9 mm gallbladder neck stone.  Wall thickness measures 5 mm.  Sonographic Carter sign is reported as negative.  Common bile duct measures 6 mm.  No intrahepatic biliary ductal dilatation.     Unremarkable liver.     Unremarkable right kidney.     Pancreas is unremarkable, as visualized.     Patent IVC.     Impression:     Cholelithiasis with wall thickening, as above.  Negative sonographic Carter sign is reported.         MRCP:  FINDINGS:  Lung bases are clear.     Liver, spleen, and pancreas appear unremarkable.     No signal loss on in and out of phase imaging.     Mild right hydronephrosis.  It is unclear if this is related to physiologic hydronephrosis in pregnancy.  Further evaluation as needed.  No left hydronephrosis.     Visualized portion of the bowel appears unremarkable.     Minimal gallbladder wall thickening of unclear significance.  No local fat stranding.  Mild cholelithiasis.     Intra and extrahepatic biliary ductal dilation without convincing choledocholithiasis.  Correlation for recently passed stone or other etiology  of biliary ductal dilation.  Common bile duct measures up to 11 mm.     Impression:     Biliary ductal dilation of unclear etiology.     No choledocholithiasis.     Mild gallbladder wall thickening without local fat stranding, not specifically favoring acute cholecystitis.  Further evaluation as needed.     Details as above.

## 2024-11-24 PROBLEM — E87.6 HYPOKALEMIA: Status: ACTIVE | Noted: 2024-11-24

## 2024-11-24 PROBLEM — O99.012 ANEMIA AFFECTING PREGNANCY IN SECOND TRIMESTER: Status: ACTIVE | Noted: 2024-11-24

## 2024-11-24 LAB
ALBUMIN SERPL BCP-MCNC: 2.4 G/DL (ref 3.5–5.2)
ALP SERPL-CCNC: 71 U/L (ref 40–150)
ALT SERPL W/O P-5'-P-CCNC: 15 U/L (ref 10–44)
ANION GAP SERPL CALC-SCNC: 8 MMOL/L (ref 8–16)
AST SERPL-CCNC: 17 U/L (ref 10–40)
BACTERIA UR CULT: NORMAL
BASOPHILS # BLD AUTO: 0.02 K/UL (ref 0–0.2)
BASOPHILS NFR BLD: 0.3 % (ref 0–1.9)
BILIRUB DIRECT SERPL-MCNC: 0.1 MG/DL (ref 0.1–0.3)
BILIRUB SERPL-MCNC: 0.4 MG/DL (ref 0.1–1)
BUN SERPL-MCNC: 2 MG/DL (ref 6–20)
CALCIUM SERPL-MCNC: 8.3 MG/DL (ref 8.7–10.5)
CHLORIDE SERPL-SCNC: 109 MMOL/L (ref 95–110)
CO2 SERPL-SCNC: 22 MMOL/L (ref 23–29)
CREAT SERPL-MCNC: 0.6 MG/DL (ref 0.5–1.4)
DIFFERENTIAL METHOD BLD: ABNORMAL
EOSINOPHIL # BLD AUTO: 0.1 K/UL (ref 0–0.5)
EOSINOPHIL NFR BLD: 0.8 % (ref 0–8)
ERYTHROCYTE [DISTWIDTH] IN BLOOD BY AUTOMATED COUNT: 14.2 % (ref 11.5–14.5)
EST. GFR  (NO RACE VARIABLE): >60 ML/MIN/1.73 M^2
GLUCOSE SERPL-MCNC: 75 MG/DL (ref 70–110)
HCT VFR BLD AUTO: 31.9 % (ref 37–48.5)
HGB BLD-MCNC: 10.5 G/DL (ref 12–16)
IMM GRANULOCYTES # BLD AUTO: 0.03 K/UL (ref 0–0.04)
IMM GRANULOCYTES NFR BLD AUTO: 0.4 % (ref 0–0.5)
LYMPHOCYTES # BLD AUTO: 1.6 K/UL (ref 1–4.8)
LYMPHOCYTES NFR BLD: 21.4 % (ref 18–48)
MCH RBC QN AUTO: 29.4 PG (ref 27–31)
MCHC RBC AUTO-ENTMCNC: 32.9 G/DL (ref 32–36)
MCV RBC AUTO: 89 FL (ref 82–98)
MONOCYTES # BLD AUTO: 0.6 K/UL (ref 0.3–1)
MONOCYTES NFR BLD: 7.6 % (ref 4–15)
NEUTROPHILS # BLD AUTO: 5.1 K/UL (ref 1.8–7.7)
NEUTROPHILS NFR BLD: 69.5 % (ref 38–73)
NRBC BLD-RTO: 0 /100 WBC
OHS QRS DURATION: 80 MS
OHS QTC CALCULATION: 450 MS
PLATELET # BLD AUTO: 203 K/UL (ref 150–450)
PMV BLD AUTO: 11.1 FL (ref 9.2–12.9)
POTASSIUM SERPL-SCNC: 3.4 MMOL/L (ref 3.5–5.1)
PROT SERPL-MCNC: 5.4 G/DL (ref 6–8.4)
RBC # BLD AUTO: 3.57 M/UL (ref 4–5.4)
SODIUM SERPL-SCNC: 139 MMOL/L (ref 136–145)
WBC # BLD AUTO: 7.34 K/UL (ref 3.9–12.7)

## 2024-11-24 PROCEDURE — 96376 TX/PRO/DX INJ SAME DRUG ADON: CPT

## 2024-11-24 PROCEDURE — G0378 HOSPITAL OBSERVATION PER HR: HCPCS

## 2024-11-24 PROCEDURE — 80076 HEPATIC FUNCTION PANEL: CPT | Performed by: COLON & RECTAL SURGERY

## 2024-11-24 PROCEDURE — 99231 SBSQ HOSP IP/OBS SF/LOW 25: CPT | Mod: ,,, | Performed by: OBSTETRICS & GYNECOLOGY

## 2024-11-24 PROCEDURE — 25000003 PHARM REV CODE 250: Performed by: OBSTETRICS & GYNECOLOGY

## 2024-11-24 PROCEDURE — 80048 BASIC METABOLIC PNL TOTAL CA: CPT | Performed by: COLON & RECTAL SURGERY

## 2024-11-24 PROCEDURE — 25000003 PHARM REV CODE 250: Performed by: COLON & RECTAL SURGERY

## 2024-11-24 PROCEDURE — 36415 COLL VENOUS BLD VENIPUNCTURE: CPT | Performed by: COLON & RECTAL SURGERY

## 2024-11-24 PROCEDURE — 63600175 PHARM REV CODE 636 W HCPCS: Performed by: OBSTETRICS & GYNECOLOGY

## 2024-11-24 PROCEDURE — 96361 HYDRATE IV INFUSION ADD-ON: CPT

## 2024-11-24 PROCEDURE — 85025 COMPLETE CBC W/AUTO DIFF WBC: CPT | Performed by: COLON & RECTAL SURGERY

## 2024-11-24 RX ORDER — PANTOPRAZOLE SODIUM 40 MG/1
40 TABLET, DELAYED RELEASE ORAL DAILY
Status: DISCONTINUED | OUTPATIENT
Start: 2024-11-24 | End: 2024-11-25 | Stop reason: HOSPADM

## 2024-11-24 RX ORDER — CALCIUM CARBONATE 200(500)MG
1000 TABLET,CHEWABLE ORAL 3 TIMES DAILY
Status: DISCONTINUED | OUTPATIENT
Start: 2024-11-24 | End: 2024-11-25 | Stop reason: HOSPADM

## 2024-11-24 RX ORDER — SIMETHICONE 80 MG
1 TABLET,CHEWABLE ORAL 3 TIMES DAILY PRN
Status: DISCONTINUED | OUTPATIENT
Start: 2024-11-24 | End: 2024-11-25 | Stop reason: HOSPADM

## 2024-11-24 RX ORDER — DOCUSATE SODIUM 100 MG
200 CAPSULE ORAL
Status: DISCONTINUED | OUTPATIENT
Start: 2024-11-24 | End: 2024-11-25 | Stop reason: HOSPADM

## 2024-11-24 RX ORDER — POTASSIUM CHLORIDE 20 MEQ/1
20 TABLET, EXTENDED RELEASE ORAL ONCE
Status: COMPLETED | OUTPATIENT
Start: 2024-11-24 | End: 2024-11-24

## 2024-11-24 RX ADMIN — ONDANSETRON 4 MG: 2 INJECTION INTRAMUSCULAR; INTRAVENOUS at 11:11

## 2024-11-24 RX ADMIN — ACETAMINOPHEN 650 MG: 325 TABLET ORAL at 04:11

## 2024-11-24 RX ADMIN — SIMETHICONE 80 MG: 80 TABLET, CHEWABLE ORAL at 02:11

## 2024-11-24 RX ADMIN — SODIUM CHLORIDE: 9 INJECTION, SOLUTION INTRAVENOUS at 06:11

## 2024-11-24 RX ADMIN — POTASSIUM CHLORIDE 20 MEQ: 1500 TABLET, EXTENDED RELEASE ORAL at 11:11

## 2024-11-24 RX ADMIN — CALCIUM CARBONATE (ANTACID) CHEW TAB 500 MG 1000 MG: 500 CHEW TAB at 07:11

## 2024-11-24 RX ADMIN — CALCIUM CARBONATE (ANTACID) CHEW TAB 500 MG 1000 MG: 500 CHEW TAB at 10:11

## 2024-11-24 RX ADMIN — PANTOPRAZOLE SODIUM 40 MG: 40 TABLET, DELAYED RELEASE ORAL at 10:11

## 2024-11-24 RX ADMIN — CALCIUM CARBONATE (ANTACID) CHEW TAB 500 MG 1000 MG: 500 CHEW TAB at 02:11

## 2024-11-24 NOTE — PROGRESS NOTES
"[facility]  Obstetrics  Antepartum Progress Note    Patient Name: Lily Linda  MRN: [id number]  Admission Date: 2024  Hospital Length of Stay: 1 days  Attending Physician: Michaela Barber,*  Primary Care Provider: Jesica, Primary Doctor    Subjective:     Principal Problem:Cholelithiasis affecting pregnancy in second trimester, antepartum    HPI:  34 y.o. female  at 21w5d EGA, presented to ED with c/o severe RUQ abdominal pain that started 2 hours after eating fried chicken. Pain has occurred intermittently since then.  Mild nausea as well.  Currently rates pain as 5 on pain scale.  No prior history of similar pain. No pregnancy concerns.  Fetal movement present.  No vaginal bleeding or fluid leakage.  No cramping or contractions.  Followed by Dr. Cora Stringer at [facility] for prenatal care.    Hospital Course:  .24--per Dr Hernandez MRCP not consistent with cholecystitis; advanced to clears  24--advanced to regular diet    Obstetric HPI:  Patient reports None contractions, active fetal movement, absent vaginal bleeding , absent loss of fluid    Tolerating clears, denies nausea/vomiting; ruq pain improved but still "feels discomfort"  Objective:     Vital Signs (Most Recent):  Temp: 98.2 °F (36.8 °C) (24)  Pulse: 81 (24 0743)  Resp: 18 (24)  BP: 118/72 (24)  SpO2: 100 % (24) Vital Signs (24h Range):  Temp:  [98 °F (36.7 °C)-98.6 °F (37 °C)] 98.2 °F (36.8 °C)  Pulse:  [] 81  Resp:  [16-18] 18  SpO2:  [97 %-100 %] 100 %  BP: (109-120)/(62-73) 118/72     Weight: 97.5 kg (215 lb)  Body mass index is 34.7 kg/m².    FHT: + on doppler       Intake/Output Summary (Last 24 hours) at 2024 1013  Last data filed at 2024 0823  Gross per 24 hour   Intake 2743.41 ml   Output --   Net 2743.41 ml       Cervix deferred    Significant Labs:  Recent Lab Results         24  0537   24  1212        Albumin 2.4   2.5       " ALP 71   77       ALT 15   22       Anion Gap 8   7       AST 17   27       Baso # 0.02   0.02       Basophil % 0.3   0.2       Bilirubin Direct 0.1   0.2       BILIRUBIN TOTAL 0.4  Comment: For infants and newborns, interpretation of results should be based  on gestational age, weight and in agreement with clinical  observations.    Premature Infant recommended reference ranges:  Up to 24 hours.............<8.0 mg/dL  Up to 48 hours............<12.0 mg/dL  3-5 days..................<15.0 mg/dL  6-29 days.................<15.0 mg/dL     0.4  Comment: For infants and newborns, interpretation of results should be based  on gestational age, weight and in agreement with clinical  observations.    Premature Infant recommended reference ranges:  Up to 24 hours.............<8.0 mg/dL  Up to 48 hours............<12.0 mg/dL  3-5 days..................<15.0 mg/dL  6-29 days.................<15.0 mg/dL         BUN 2   3       Calcium 8.3   8.1       Chloride 109   109       CO2 22   21       Creatinine 0.6   0.6       Differential Method Automated   Automated       eGFR >60   >60       Eos # 0.1   0.0       Eos % 0.8   0.2       Glucose 75   73       Gran # (ANC) 5.1   7.3       Gran % 69.5   77.1       Hematocrit 31.9   33.1       Hemoglobin 10.5   11.0       Immature Grans (Abs) 0.03  Comment: Mild elevation in immature granulocytes is non specific and   can be seen in a variety of conditions including stress response,   acute inflammation, trauma and pregnancy. Correlation with other   laboratory and clinical findings is essential.     0.05  Comment: Mild elevation in immature granulocytes is non specific and   can be seen in a variety of conditions including stress response,   acute inflammation, trauma and pregnancy. Correlation with other   laboratory and clinical findings is essential.         Immature Granulocytes 0.4   0.5       Lymph # 1.6   1.4       Lymph % 21.4   15.3       MCH 29.4   29.5       MCHC 32.9   33.2        MCV 89   89       Mono # 0.6   0.6       Mono % 7.6   6.7       MPV 11.1   11.0       nRBC 0   0       Platelet Count 203   230       Potassium 3.4   3.8       PROTEIN TOTAL 5.4   5.7       RBC 3.57   3.73       RDW 14.2   14.1       Sodium 139   137       WBC 7.34   9.43               Physical Exam:   Constitutional: She is oriented to person, place, and time. She appears well-developed and well-nourished.    HENT:   Head: Normocephalic.    Eyes: Pupils are equal, round, and reactive to light. Conjunctivae are normal.     Cardiovascular:  Normal rate and regular rhythm.             Pulmonary/Chest: Effort normal.        Abdominal: Soft. Bowel sounds are normal. There is abdominal tenderness.   Ruq tenderness--improved     Genitourinary:    Genitourinary Comments: Gravid, non tender             Musculoskeletal: Normal range of motion.       Neurological: She is alert and oriented to person, place, and time. She has normal reflexes.    Skin: Skin is warm and dry.    Psychiatric: She has a normal mood and affect. Her behavior is normal. Judgment and thought content normal.       Review of Systems   Gastrointestinal:  Positive for abdominal pain.   All other systems reviewed and are negative.    Assessment/Plan:     34 y.o. female  at 21w6d for:    * Cholelithiasis affecting pregnancy in second trimester, antepartum  Admit for observation. IV hydration.  General surgery consult.  MRCP scheduled today.  2024  Management as per Dr Hernandez--general surgery  Advancing diet to regular  Discharge as per General surgery recommendations    Hypokalemia  2024  Add potassium    Intrauterine normal pregnancy, second trimester  2024  No ob tissues  +fetal movement,  Denies vaginal bleeding or contractions          Johanna Randall MD  Obstetrics  [facility]

## 2024-11-24 NOTE — SUBJECTIVE & OBJECTIVE
"Obstetric HPI:  Patient reports None contractions, active fetal movement, absent vaginal bleeding , absent loss of fluid    Tolerating clears, denies nausea/vomiting; ruq pain improved but still "feels discomfort"  Objective:     Vital Signs (Most Recent):  Temp: 98.2 °F (36.8 °C) (11/24/24 0718)  Pulse: 81 (11/24/24 0743)  Resp: 18 (11/24/24 0718)  BP: 118/72 (11/24/24 0718)  SpO2: 100 % (11/24/24 0718) Vital Signs (24h Range):  Temp:  [98 °F (36.7 °C)-98.6 °F (37 °C)] 98.2 °F (36.8 °C)  Pulse:  [] 81  Resp:  [16-18] 18  SpO2:  [97 %-100 %] 100 %  BP: (109-120)/(62-73) 118/72     Weight: 97.5 kg (215 lb)  Body mass index is 34.7 kg/m².    FHT: + on doppler       Intake/Output Summary (Last 24 hours) at 11/24/2024 1013  Last data filed at 11/24/2024 0823  Gross per 24 hour   Intake 2743.41 ml   Output --   Net 2743.41 ml       Cervix deferred    Significant Labs:  Recent Lab Results         11/24/24  0537   11/23/24  1212        Albumin 2.4   2.5       ALP 71   77       ALT 15   22       Anion Gap 8   7       AST 17   27       Baso # 0.02   0.02       Basophil % 0.3   0.2       Bilirubin Direct 0.1   0.2       BILIRUBIN TOTAL 0.4  Comment: For infants and newborns, interpretation of results should be based  on gestational age, weight and in agreement with clinical  observations.    Premature Infant recommended reference ranges:  Up to 24 hours.............<8.0 mg/dL  Up to 48 hours............<12.0 mg/dL  3-5 days..................<15.0 mg/dL  6-29 days.................<15.0 mg/dL     0.4  Comment: For infants and newborns, interpretation of results should be based  on gestational age, weight and in agreement with clinical  observations.    Premature Infant recommended reference ranges:  Up to 24 hours.............<8.0 mg/dL  Up to 48 hours............<12.0 mg/dL  3-5 days..................<15.0 mg/dL  6-29 days.................<15.0 mg/dL         BUN 2   3       Calcium 8.3   8.1       Chloride 109   109    "    CO2 22   21       Creatinine 0.6   0.6       Differential Method Automated   Automated       eGFR >60   >60       Eos # 0.1   0.0       Eos % 0.8   0.2       Glucose 75   73       Gran # (ANC) 5.1   7.3       Gran % 69.5   77.1       Hematocrit 31.9   33.1       Hemoglobin 10.5   11.0       Immature Grans (Abs) 0.03  Comment: Mild elevation in immature granulocytes is non specific and   can be seen in a variety of conditions including stress response,   acute inflammation, trauma and pregnancy. Correlation with other   laboratory and clinical findings is essential.     0.05  Comment: Mild elevation in immature granulocytes is non specific and   can be seen in a variety of conditions including stress response,   acute inflammation, trauma and pregnancy. Correlation with other   laboratory and clinical findings is essential.         Immature Granulocytes 0.4   0.5       Lymph # 1.6   1.4       Lymph % 21.4   15.3       MCH 29.4   29.5       MCHC 32.9   33.2       MCV 89   89       Mono # 0.6   0.6       Mono % 7.6   6.7       MPV 11.1   11.0       nRBC 0   0       Platelet Count 203   230       Potassium 3.4   3.8       PROTEIN TOTAL 5.4   5.7       RBC 3.57   3.73       RDW 14.2   14.1       Sodium 139   137       WBC 7.34   9.43               Physical Exam:   Constitutional: She is oriented to person, place, and time. She appears well-developed and well-nourished.    HENT:   Head: Normocephalic.    Eyes: Pupils are equal, round, and reactive to light. Conjunctivae are normal.     Cardiovascular:  Normal rate and regular rhythm.             Pulmonary/Chest: Effort normal.        Abdominal: Soft. Bowel sounds are normal. There is abdominal tenderness.   Ruq tenderness--improved     Genitourinary:    Genitourinary Comments: Gravid, non tender             Musculoskeletal: Normal range of motion.       Neurological: She is alert and oriented to person, place, and time. She has normal reflexes.    Skin: Skin is warm  and dry.    Psychiatric: She has a normal mood and affect. Her behavior is normal. Judgment and thought content normal.       Review of Systems   Gastrointestinal:  Positive for abdominal pain.   All other systems reviewed and are negative.

## 2024-11-24 NOTE — ASSESSMENT & PLAN NOTE
35yo F with likely symptomatic cholelithiasis during pregnancy, but also likely GERD    - No emergent surgical intervention required at this time.  Patient has no leukocytosis, no current right upper quadrant abdominal pain and equivocal imaging findings.    - Believe this pain to be more likely GERD related versus symptomatic cholelithiasis.  - patient is on Protonix at home.  This needs to be continued and PRN indigestion treatment  - trial regular low-fat diet today.  If patient is able to tolerate diet without pain or worsening symptoms, can likely be discharged home with close OBGYN follow up  - no need for antibiotics at this time as I do not believe this to be cholecystitis  - will continue to follow

## 2024-11-24 NOTE — SUBJECTIVE & OBJECTIVE
Interval History:  Still with some intermittent epigastric pain but no right upper quadrant abdominal pain.  Denies any nausea or vomiting. Tolerating clear liquids.     Medications:  Continuous Infusions:   0.9% NaCl   Intravenous Continuous 125 mL/hr at 11/24/24 0642 Rate Verify at 11/24/24 0642     Scheduled Meds:   calcium carbonate  1,000 mg Oral TID    pantoprazole  40 mg Oral Daily     PRN Meds:  Current Facility-Administered Medications:     acetaminophen, 650 mg, Oral, Q6H PRN    ondansetron, 4 mg, Intravenous, Q6H PRN    prochlorperazine, 5 mg, Intravenous, Q6H PRN    sodium chloride 0.9%, 10 mL, Intravenous, PRN     Review of patient's allergies indicates:  No Known Allergies  Objective:     Vital Signs (Most Recent):  Temp: 98.2 °F (36.8 °C) (11/24/24 0718)  Pulse: 81 (11/24/24 0743)  Resp: 18 (11/24/24 0718)  BP: 118/72 (11/24/24 0718)  SpO2: 100 % (11/24/24 0718) Vital Signs (24h Range):  Temp:  [98 °F (36.7 °C)-98.6 °F (37 °C)] 98.2 °F (36.8 °C)  Pulse:  [] 81  Resp:  [16-18] 18  SpO2:  [97 %-100 %] 100 %  BP: (109-120)/(62-73) 118/72     Weight: 97.5 kg (215 lb)  Body mass index is 34.7 kg/m².    Intake/Output - Last 3 Shifts         11/22 0700 11/23 0659 11/23 0700 11/24 0659 11/24 0700 11/25 0659    P.O. 1200  120    I.V. (mL/kg)  2841.8 (29.1)     IV Piggyback 999      Total Intake(mL/kg) 2199 (22.6) 2841.8 (29.1) 120 (1.2)    Net +2199 +2841.8 +120           Urine Occurrence  5 x     Stool Occurrence  0 x              Physical Exam  Constitutional:       Appearance: She is well-developed.   HENT:      Head: Normocephalic and atraumatic.   Eyes:      Conjunctiva/sclera: Conjunctivae normal.   Neck:      Thyroid: No thyromegaly.   Cardiovascular:      Rate and Rhythm: Normal rate and regular rhythm.   Pulmonary:      Effort: Pulmonary effort is normal. No respiratory distress.   Abdominal:      Comments: Soft, nondistended, very mild TTP epigastric; no RUQ TTP; no Carter's sign    Musculoskeletal:         General: No tenderness. Normal range of motion.      Cervical back: Normal range of motion.   Skin:     General: Skin is warm and dry.      Capillary Refill: Capillary refill takes less than 2 seconds.      Findings: No rash.   Neurological:      Mental Status: She is alert and oriented to person, place, and time.          Significant Labs:  I have reviewed all pertinent lab results within the past 24 hours.  CBC:   Recent Labs   Lab 11/24/24  0537   WBC 7.34   RBC 3.57*   HGB 10.5*   HCT 31.9*      MCV 89   MCH 29.4   MCHC 32.9     BMP:   Recent Labs   Lab 11/24/24  0537   GLU 75      K 3.4*      CO2 22*   BUN 2*   CREATININE 0.6   CALCIUM 8.3*     CMP:   Recent Labs   Lab 11/24/24  0537   GLU 75   CALCIUM 8.3*   ALBUMIN 2.4*   PROT 5.4*      K 3.4*   CO2 22*      BUN 2*   CREATININE 0.6   ALKPHOS 71   ALT 15   AST 17   BILITOT 0.4     LFTs:   Recent Labs   Lab 11/24/24  0537   ALT 15   AST 17   ALKPHOS 71   BILITOT 0.4   PROT 5.4*   ALBUMIN 2.4*       Significant Diagnostics:  I have reviewed all pertinent imaging results/findings within the past 24 hours.

## 2024-11-24 NOTE — PROGRESS NOTES
[facility]  General Surgery  Progress Note    Subjective:     Interval History:  Still with some intermittent epigastric pain but no right upper quadrant abdominal pain.  Denies any nausea or vomiting. Tolerating clear liquids.     Medications:  Continuous Infusions:   0.9% NaCl   Intravenous Continuous 125 mL/hr at 11/24/24 0642 Rate Verify at 11/24/24 0642     Scheduled Meds:   calcium carbonate  1,000 mg Oral TID    pantoprazole  40 mg Oral Daily     PRN Meds:  Current Facility-Administered Medications:     acetaminophen, 650 mg, Oral, Q6H PRN    ondansetron, 4 mg, Intravenous, Q6H PRN    prochlorperazine, 5 mg, Intravenous, Q6H PRN    sodium chloride 0.9%, 10 mL, Intravenous, PRN     Review of patient's allergies indicates:  No Known Allergies  Objective:     Vital Signs (Most Recent):  Temp: 98.2 °F (36.8 °C) (11/24/24 0718)  Pulse: 81 (11/24/24 0743)  Resp: 18 (11/24/24 0718)  BP: 118/72 (11/24/24 0718)  SpO2: 100 % (11/24/24 0718) Vital Signs (24h Range):  Temp:  [98 °F (36.7 °C)-98.6 °F (37 °C)] 98.2 °F (36.8 °C)  Pulse:  [] 81  Resp:  [16-18] 18  SpO2:  [97 %-100 %] 100 %  BP: (109-120)/(62-73) 118/72     Weight: 97.5 kg (215 lb)  Body mass index is 34.7 kg/m².    Intake/Output - Last 3 Shifts         11/22 0700 11/23 0659 11/23 0700 11/24 0659 11/24 0700 11/25 0659    P.O. 1200  120    I.V. (mL/kg)  2841.8 (29.1)     IV Piggyback 999      Total Intake(mL/kg) 2199 (22.6) 2841.8 (29.1) 120 (1.2)    Net +2199 +2841.8 +120           Urine Occurrence  5 x     Stool Occurrence  0 x              Physical Exam  Constitutional:       Appearance: She is well-developed.   HENT:      Head: Normocephalic and atraumatic.   Eyes:      Conjunctiva/sclera: Conjunctivae normal.   Neck:      Thyroid: No thyromegaly.   Cardiovascular:      Rate and Rhythm: Normal rate and regular rhythm.   Pulmonary:      Effort: Pulmonary effort is normal. No respiratory distress.   Abdominal:      Comments: Soft,  nondistended, very mild TTP epigastric; no RUQ TTP; no Carter's sign   Musculoskeletal:         General: No tenderness. Normal range of motion.      Cervical back: Normal range of motion.   Skin:     General: Skin is warm and dry.      Capillary Refill: Capillary refill takes less than 2 seconds.      Findings: No rash.   Neurological:      Mental Status: She is alert and oriented to person, place, and time.          Significant Labs:  I have reviewed all pertinent lab results within the past 24 hours.  CBC:   Recent Labs   Lab 11/24/24  0537   WBC 7.34   RBC 3.57*   HGB 10.5*   HCT 31.9*      MCV 89   MCH 29.4   MCHC 32.9     BMP:   Recent Labs   Lab 11/24/24  0537   GLU 75      K 3.4*      CO2 22*   BUN 2*   CREATININE 0.6   CALCIUM 8.3*     CMP:   Recent Labs   Lab 11/24/24  0537   GLU 75   CALCIUM 8.3*   ALBUMIN 2.4*   PROT 5.4*      K 3.4*   CO2 22*      BUN 2*   CREATININE 0.6   ALKPHOS 71   ALT 15   AST 17   BILITOT 0.4     LFTs:   Recent Labs   Lab 11/24/24  0537   ALT 15   AST 17   ALKPHOS 71   BILITOT 0.4   PROT 5.4*   ALBUMIN 2.4*       Significant Diagnostics:  I have reviewed all pertinent imaging results/findings within the past 24 hours.  Assessment/Plan:     * Cholelithiasis affecting pregnancy in second trimester, antepartum  35yo F with likely symptomatic cholelithiasis during pregnancy, but also likely GERD    - No emergent surgical intervention required at this time.  Patient has no leukocytosis, no current right upper quadrant abdominal pain and equivocal imaging findings.    - Believe this pain to be more likely GERD related versus symptomatic cholelithiasis.  - patient is on Protonix at home.  This needs to be continued and PRN indigestion treatment  - trial regular low-fat diet today.  If patient is able to tolerate diet without pain or worsening symptoms, can likely be discharged home with close OBGYN follow up  - no need for antibiotics at this time as I do  not believe this to be cholecystitis  - will continue to follow    Intrauterine normal pregnancy, second trimester  Care per SRINIVASA Hernandez MD  General Surgery  [facility]

## 2024-11-24 NOTE — HOSPITAL COURSE
.11/23/24--per Dr Hernandez MRCP not consistent with cholecystitis;  likely symptomatic cholelithiasis during pregnancy, but also likely GERD, advanced to clears  11/24/24--advanced to regular diet  11/25/24- Tolerating regular diet, pain well controlled

## 2024-11-24 NOTE — ASSESSMENT & PLAN NOTE
Admit for observation. IV hydration.  General surgery consult.  [facility] scheduled today.  11/24/2024  Management as per Dr Hernandez--general surgery  Advancing diet to regular  Discharge as per General surgery recommendations

## 2024-11-24 NOTE — PLAN OF CARE
[facility]  Discharge Assessment    Primary Care Provider: No, Primary Doctor     Discharge Assessment (most recent)       BRIEF DISCHARGE ASSESSMENT - 11/24/24 1152          Discharge Planning    Assessment Type Discharge Planning Brief Assessment     Resource/Environmental Concerns none     Support Systems Spouse/significant other     Equipment Currently Used at Home none     Current Living Arrangements home     Patient/Family Anticipates Transition to home     Patient/Family Anticipated Services at Transition none     DME Needed Upon Discharge  none     Discharge Plan A Home with family     Discharge Plan B Home Health                     No anticipated needs.

## 2024-11-25 VITALS
RESPIRATION RATE: 16 BRPM | WEIGHT: 220.25 LBS | SYSTOLIC BLOOD PRESSURE: 129 MMHG | DIASTOLIC BLOOD PRESSURE: 63 MMHG | TEMPERATURE: 98 F | HEIGHT: 66 IN | HEART RATE: 83 BPM | OXYGEN SATURATION: 100 % | BODY MASS INDEX: 35.4 KG/M2

## 2024-11-25 PROBLEM — E87.6 HYPOKALEMIA: Status: RESOLVED | Noted: 2024-11-24 | Resolved: 2024-11-25

## 2024-11-25 PROCEDURE — G0378 HOSPITAL OBSERVATION PER HR: HCPCS

## 2024-11-25 PROCEDURE — 25000003 PHARM REV CODE 250: Performed by: COLON & RECTAL SURGERY

## 2024-11-25 PROCEDURE — 99238 HOSP IP/OBS DSCHRG MGMT 30/<: CPT | Mod: ,,, | Performed by: STUDENT IN AN ORGANIZED HEALTH CARE EDUCATION/TRAINING PROGRAM

## 2024-11-25 RX ORDER — PANTOPRAZOLE SODIUM 40 MG/1
40 TABLET, DELAYED RELEASE ORAL DAILY
Status: CANCELLED | OUTPATIENT
Start: 2024-11-25

## 2024-11-25 RX ORDER — PANTOPRAZOLE SODIUM 40 MG/1
40 TABLET, DELAYED RELEASE ORAL DAILY
Qty: 90 TABLET | Refills: 3 | Status: SHIPPED | OUTPATIENT
Start: 2024-11-25 | End: 2025-11-25

## 2024-11-25 RX ADMIN — CALCIUM CARBONATE (ANTACID) CHEW TAB 500 MG 1000 MG: 500 CHEW TAB at 09:11

## 2024-11-25 RX ADMIN — PANTOPRAZOLE SODIUM 40 MG: 40 TABLET, DELAYED RELEASE ORAL at 09:11

## 2024-11-25 NOTE — DISCHARGE SUMMARY
[facility]  Obstetrics  Discharge Summary      Patient Name: Lily Linda  MRN: [id number]  Admission Date: 2024  Hospital Length of Stay: 1 days  Discharge Date and Time:  2024 8:12 AM  Attending Physician: Michaela Ribera,*   Discharging Provider: Jayashree Noe MD   Primary Care Provider: Jesica, Primary Doctor    HPI: 34 y.o. female  at 21w5d EGA, presented to ED with c/o severe RUQ abdominal pain that started 2 hours after eating fried chicken. Pain has occurred intermittently since then.  Mild nausea as well.  Currently rates pain as 5 on pain scale.  No prior history of similar pain. No pregnancy concerns.  Fetal movement present.  No vaginal bleeding or fluid leakage.  No cramping or contractions.  Followed by Dr. Cora Stringer at [facility] for prenatal care.      * No surgery found *     Hospital Course:   .24--per Dr Hernandez MRCP not consistent with cholecystitis;  likely symptomatic cholelithiasis during pregnancy, but also likely GERD, advanced to clears  24--advanced to regular diet  24- Tolerating regular diet, pain well controlled     Consults (From admission, onward)          Status Ordering Provider     Inpatient consult to General surgery  Once        Provider:  Wm Hernandez MD    Completed MICHAELA RIBERA            Final Active Diagnoses:    Diagnosis Date Noted POA    PRINCIPAL PROBLEM:  Cholelithiasis affecting pregnancy in second trimester, antepartum [O26.612, K80.20] 2024 Yes    Anemia affecting pregnancy in second trimester [O99.012] 2024 Yes    Intrauterine normal pregnancy, second trimester [Z34.92] 2024 Not Applicable      Problems Resolved During this Admission:    Diagnosis Date Noted Date Resolved POA    Hypokalemia [E87.6] 2024 Yes        Significant Diagnostic Studies: MRCP 24      Discharged Condition: stable    Disposition: Home or Self Care    Follow Up:    Patient  Instructions:      Diet Adult Regular   Order Comments: Low- Fat diet     Notify your health care provider if you experience any of the following:  temperature >100.4     Notify your health care provider if you experience any of the following:  persistent nausea and vomiting or diarrhea     Notify your health care provider if you experience any of the following:  increased confusion or weakness     Notify your health care provider if you experience any of the following:  persistent dizziness, light-headedness, or visual disturbances     Notify your health care provider if you experience any of the following:  difficulty breathing or increased cough     Notify your health care provider if you experience any of the following:  severe persistent headache     Notify your health care provider if you experience any of the following:  worsening rash     Notify your health care provider if you experience any of the following:  severe uncontrolled pain     Activity as tolerated     Medications:  Current Discharge Medication List        CONTINUE these medications which have CHANGED    Details   pantoprazole (PROTONIX) 40 MG tablet Take 1 tablet (40 mg total) by mouth once daily.  Qty: 90 tablet, Refills: 3           CONTINUE these medications which have NOT CHANGED    Details   aspirin 81 MG Chew Take 1 tablet (81 mg total) by mouth once daily.  Qty: 30 tablet, Refills: 11      ergocalciferol (ERGOCALCIFEROL) 50,000 unit Cap Take 1 capsule (50,000 Units total) by mouth every 7 days.  Qty: 10 capsule, Refills: 3      metFORMIN (GLUCOPHAGE-XR) 500 MG ER 24hr tablet Take 1 tablet (500 mg total) by mouth 2 (two) times daily with meals.  Qty: 60 tablet, Refills: 2    Associated Diagnoses: Abnormal weight gain; Fatigue, unspecified type      ondansetron (ZOFRAN) 4 MG tablet Take 1 tablet (4 mg total) by mouth 2 (two) times daily.  Qty: 30 tablet, Refills: 1    Associated Diagnoses: Nausea      promethazine (PHENERGAN) 25 MG tablet  Take 1 tablet (25 mg total) by mouth every 6 (six) hours as needed for Nausea (can cause drowsiness).  Qty: 30 tablet, Refills: 1    Associated Diagnoses: Nausea             Jayashree Noe MD  Obstetrics  [facility]

## 2024-11-25 NOTE — SUBJECTIVE & OBJECTIVE
Interval History:  No further abdominal pain.  Tolerating diet without issue.    Medications:  Continuous Infusions:  Scheduled Meds:   calcium carbonate  1,000 mg Oral TID    electrolytes-dextrose  200 mL Oral Q4H    pantoprazole  40 mg Oral Daily     PRN Meds:  Current Facility-Administered Medications:     acetaminophen, 650 mg, Oral, Q6H PRN    ondansetron, 4 mg, Intravenous, Q6H PRN    prochlorperazine, 5 mg, Intravenous, Q6H PRN    simethicone, 1 tablet, Oral, TID PRN    sodium chloride 0.9%, 10 mL, Intravenous, PRN     Review of patient's allergies indicates:  No Known Allergies  Objective:     Vital Signs (Most Recent):  Temp: 98.2 °F (36.8 °C) (11/25/24 0800)  Pulse: 83 (11/25/24 0800)  Resp: 16 (11/25/24 0800)  BP: 129/63 (11/25/24 0800)  SpO2: 100 % (11/25/24 0800) Vital Signs (24h Range):  Temp:  [98 °F (36.7 °C)-98.4 °F (36.9 °C)] 98.2 °F (36.8 °C)  Pulse:  [70-90] 83  Resp:  [16-18] 16  SpO2:  [99 %-100 %] 100 %  BP: (121-130)/(63-80) 129/63     Weight: 99.9 kg (220 lb 3.8 oz)  Body mass index is 35.55 kg/m².    Intake/Output - Last 3 Shifts         11/23 0700 11/24 0659 11/24 0700 11/25 0659 11/25 0700 11/26 0659    P.O.  120     I.V. (mL/kg) 2841.8 (29.1)      IV Piggyback       Total Intake(mL/kg) 2841.8 (29.1) 120 (1.2)     Net +2841.8 +120            Urine Occurrence 5 x 3 x     Stool Occurrence 0 x 0 x              Physical Exam  Constitutional:       Appearance: She is well-developed.   HENT:      Head: Normocephalic and atraumatic.   Eyes:      Conjunctiva/sclera: Conjunctivae normal.   Neck:      Thyroid: No thyromegaly.   Cardiovascular:      Rate and Rhythm: Normal rate and regular rhythm.   Pulmonary:      Effort: Pulmonary effort is normal. No respiratory distress.   Abdominal:      Comments: Soft, nondistended, nontender; no RUQ TTP; no Carter's sign   Musculoskeletal:         General: No tenderness. Normal range of motion.      Cervical back: Normal range of motion.   Skin:      General: Skin is warm and dry.      Capillary Refill: Capillary refill takes less than 2 seconds.      Findings: No rash.   Neurological:      Mental Status: She is alert and oriented to person, place, and time.          Significant Labs:  I have reviewed all pertinent lab results within the past 24 hours.  CBC:   Recent Labs   Lab 11/24/24  0537   WBC 7.34   RBC 3.57*   HGB 10.5*   HCT 31.9*      MCV 89   MCH 29.4   MCHC 32.9     BMP:   Recent Labs   Lab 11/24/24  0537   GLU 75      K 3.4*      CO2 22*   BUN 2*   CREATININE 0.6   CALCIUM 8.3*     CMP:   Recent Labs   Lab 11/24/24  0537   GLU 75   CALCIUM 8.3*   ALBUMIN 2.4*   PROT 5.4*      K 3.4*   CO2 22*      BUN 2*   CREATININE 0.6   ALKPHOS 71   ALT 15   AST 17   BILITOT 0.4     LFTs:   Recent Labs   Lab 11/24/24  0537   ALT 15   AST 17   ALKPHOS 71   BILITOT 0.4   PROT 5.4*   ALBUMIN 2.4*       Significant Diagnostics:  I have reviewed all pertinent imaging results/findings within the past 24 hours.

## 2024-11-25 NOTE — PROGRESS NOTES
[facility]  General Surgery  Progress Note    Subjective:     Interval History:  No further abdominal pain.  Tolerating diet without issue.    Medications:  Continuous Infusions:  Scheduled Meds:   calcium carbonate  1,000 mg Oral TID    electrolytes-dextrose  200 mL Oral Q4H    pantoprazole  40 mg Oral Daily     PRN Meds:  Current Facility-Administered Medications:     acetaminophen, 650 mg, Oral, Q6H PRN    ondansetron, 4 mg, Intravenous, Q6H PRN    prochlorperazine, 5 mg, Intravenous, Q6H PRN    simethicone, 1 tablet, Oral, TID PRN    sodium chloride 0.9%, 10 mL, Intravenous, PRN     Review of patient's allergies indicates:  No Known Allergies  Objective:     Vital Signs (Most Recent):  Temp: 98.2 °F (36.8 °C) (11/25/24 0800)  Pulse: 83 (11/25/24 0800)  Resp: 16 (11/25/24 0800)  BP: 129/63 (11/25/24 0800)  SpO2: 100 % (11/25/24 0800) Vital Signs (24h Range):  Temp:  [98 °F (36.7 °C)-98.4 °F (36.9 °C)] 98.2 °F (36.8 °C)  Pulse:  [70-90] 83  Resp:  [16-18] 16  SpO2:  [99 %-100 %] 100 %  BP: (121-130)/(63-80) 129/63     Weight: 99.9 kg (220 lb 3.8 oz)  Body mass index is 35.55 kg/m².    Intake/Output - Last 3 Shifts         11/23 0700 11/24 0659 11/24 0700 11/25 0659 11/25 0700 11/26 0659    P.O.  120     I.V. (mL/kg) 2841.8 (29.1)      IV Piggyback       Total Intake(mL/kg) 2841.8 (29.1) 120 (1.2)     Net +2841.8 +120            Urine Occurrence 5 x 3 x     Stool Occurrence 0 x 0 x              Physical Exam  Constitutional:       Appearance: She is well-developed.   HENT:      Head: Normocephalic and atraumatic.   Eyes:      Conjunctiva/sclera: Conjunctivae normal.   Neck:      Thyroid: No thyromegaly.   Cardiovascular:      Rate and Rhythm: Normal rate and regular rhythm.   Pulmonary:      Effort: Pulmonary effort is normal. No respiratory distress.   Abdominal:      Comments: Soft, nondistended, nontender; no RUQ TTP; no Carter's sign   Musculoskeletal:         General: No tenderness. Normal range of  motion.      Cervical back: Normal range of motion.   Skin:     General: Skin is warm and dry.      Capillary Refill: Capillary refill takes less than 2 seconds.      Findings: No rash.   Neurological:      Mental Status: She is alert and oriented to person, place, and time.          Significant Labs:  I have reviewed all pertinent lab results within the past 24 hours.  CBC:   Recent Labs   Lab 11/24/24  0537   WBC 7.34   RBC 3.57*   HGB 10.5*   HCT 31.9*      MCV 89   MCH 29.4   MCHC 32.9     BMP:   Recent Labs   Lab 11/24/24  0537   GLU 75      K 3.4*      CO2 22*   BUN 2*   CREATININE 0.6   CALCIUM 8.3*     CMP:   Recent Labs   Lab 11/24/24  0537   GLU 75   CALCIUM 8.3*   ALBUMIN 2.4*   PROT 5.4*      K 3.4*   CO2 22*      BUN 2*   CREATININE 0.6   ALKPHOS 71   ALT 15   AST 17   BILITOT 0.4     LFTs:   Recent Labs   Lab 11/24/24  0537   ALT 15   AST 17   ALKPHOS 71   BILITOT 0.4   PROT 5.4*   ALBUMIN 2.4*       Significant Diagnostics:  I have reviewed all pertinent imaging results/findings within the past 24 hours.  Assessment/Plan:     * Cholelithiasis affecting pregnancy in second trimester, antepartum  35yo F with possible symptomatic cholelithiasis during pregnancy, but also likely GERD    - No emergent surgical intervention required at this time.  Patient has no leukocytosis, no current right upper quadrant abdominal pain and equivocal imaging findings.  Do not believe this to be cholecystitis  - Believe this pain to be more likely GERD related as pain has resolved with PPI/Tums.  - patient is on Protonix at home.  This needs to be continued and PRN indigestion treatment  - cont reg diet for now  - no need for antibiotics at this time as I do not believe this to be cholecystitis  - okay for discharge from surgical perspective and can followup with established OBGYN for further management.     Intrauterine normal pregnancy, second trimester  Care per OBGYN      Wm GARCIA  MD Mary  General Surgery  [facility] S[facility] 3

## 2024-11-25 NOTE — PLAN OF CARE
Problem:  Fall Injury Risk  Goal: Absence of Fall, Infant Drop and Related Injury  Outcome: Progressing     Problem: Adult Inpatient Plan of Care  Goal: Plan of Care Review  Outcome: Progressing     Problem: Adult Inpatient Plan of Care  Goal: Plan of Care Review  Outcome: Progressing  Goal: Patient-Specific Goal (Individualized)  Outcome: Progressing  Goal: Absence of Hospital-Acquired Illness or Injury  Outcome: Progressing  Goal: Optimal Comfort and Wellbeing  Outcome: Progressing  Goal: Readiness for Transition of Care  Outcome: Progressing

## 2024-11-25 NOTE — ASSESSMENT & PLAN NOTE
35yo F with possible symptomatic cholelithiasis during pregnancy, but also likely GERD    - No emergent surgical intervention required at this time.  Patient has no leukocytosis, no current right upper quadrant abdominal pain and equivocal imaging findings.  Do not believe this to be cholecystitis  - Believe this pain to be more likely GERD related as pain has resolved with PPI/Tums.  - patient is on Protonix at home.  This needs to be continued and PRN indigestion treatment  - cont reg diet for now  - no need for antibiotics at this time as I do not believe this to be cholecystitis  - okay for discharge from surgical perspective and can followup with established OBGYN for further management.